# Patient Record
Sex: MALE | Race: WHITE | NOT HISPANIC OR LATINO | Employment: OTHER | URBAN - NONMETROPOLITAN AREA
[De-identification: names, ages, dates, MRNs, and addresses within clinical notes are randomized per-mention and may not be internally consistent; named-entity substitution may affect disease eponyms.]

---

## 2017-01-30 ENCOUNTER — TELEPHONE (OUTPATIENT)
Dept: CARDIOLOGY | Facility: CLINIC | Age: 72
End: 2017-01-30

## 2017-01-30 NOTE — TELEPHONE ENCOUNTER
Pt's wife called, has an appointment with Claribel 2/8/17, having a lot more problems, would like to see Dr Kaplan if possible.  Appointment changed to 2/ 2/17 at 3:00.  Wife aware.

## 2017-02-02 ENCOUNTER — OFFICE VISIT (OUTPATIENT)
Dept: CARDIOLOGY | Facility: CLINIC | Age: 72
End: 2017-02-02

## 2017-02-02 VITALS
HEART RATE: 80 BPM | BODY MASS INDEX: 31.7 KG/M2 | HEIGHT: 69 IN | WEIGHT: 214 LBS | DIASTOLIC BLOOD PRESSURE: 62 MMHG | SYSTOLIC BLOOD PRESSURE: 138 MMHG

## 2017-02-02 DIAGNOSIS — I10 ESSENTIAL HYPERTENSION: ICD-10-CM

## 2017-02-02 DIAGNOSIS — E78.00 HYPERCHOLESTEREMIA: ICD-10-CM

## 2017-02-02 DIAGNOSIS — I25.9 IHD (ISCHEMIC HEART DISEASE): Primary | ICD-10-CM

## 2017-02-02 DIAGNOSIS — R09.89 BILATERAL CAROTID BRUITS: ICD-10-CM

## 2017-02-02 DIAGNOSIS — R07.89 SENSATION OF CHEST PRESSURE: ICD-10-CM

## 2017-02-02 DIAGNOSIS — E11.9 TYPE 2 DIABETES MELLITUS WITHOUT COMPLICATION, WITH LONG-TERM CURRENT USE OF INSULIN (HCC): ICD-10-CM

## 2017-02-02 DIAGNOSIS — R06.02 SHORTNESS OF BREATH: ICD-10-CM

## 2017-02-02 DIAGNOSIS — Z79.4 TYPE 2 DIABETES MELLITUS WITHOUT COMPLICATION, WITH LONG-TERM CURRENT USE OF INSULIN (HCC): ICD-10-CM

## 2017-02-02 DIAGNOSIS — R42 DIZZINESS: ICD-10-CM

## 2017-02-02 DIAGNOSIS — R01.1 MURMUR, CARDIAC: ICD-10-CM

## 2017-02-02 PROCEDURE — 99214 OFFICE O/P EST MOD 30 MIN: CPT | Performed by: INTERNAL MEDICINE

## 2017-02-02 RX ORDER — CHOLESTYRAMINE LIGHT 4 G/5.7G
4 POWDER, FOR SUSPENSION ORAL 2 TIMES DAILY
Qty: 60 PACKET | Refills: 8 | Status: SHIPPED | OUTPATIENT
Start: 2017-02-02 | End: 2018-02-05 | Stop reason: ALTCHOICE

## 2017-02-02 RX ORDER — ISOSORBIDE MONONITRATE 30 MG/1
30 TABLET, EXTENDED RELEASE ORAL DAILY
COMMUNITY
End: 2017-07-31 | Stop reason: ALTCHOICE

## 2017-02-02 NOTE — PROGRESS NOTES
"Chief Complaint   Patient presents with   • Follow-up     Wife reports that last Sunday his lips turned blue while at [employer], he went home place oxygen on and was ok. He states \"Dr Arredondo does like how neck sounds\". He reports has occ sharp pain left chest and having left elbow pain, takes Nitro that relieves the pain. He states sometimes feels like heart is skipping beats.    • Dizziness     Is getting worse.  PCP writes refills on medication. Has increased Praluent to 150mg every 2 weeks. Patient has recent labs.       CARDIAC COMPLAINTS  chest pressure/discomfort, dyspnea and Dizziness        Subjective   Garry Yen is a 71 y.o. male came in today to be reevaluated for new symptoms.  He was diagnosed with ischemic heart disease in 2013 when he underwent stenting.  He also had more stenting placed in June of last year.  He was also noted to have significant carotid artery disease and underwent stenting in 2014.  He did have some narrowing by a carotid ultrasound last year.  He also has severe dyslipidemia and unable to tolerate any of the cholesterol lowering medications.  He was tried on Repatha, and apparently he did increase his sugar.  He is now on Praluent and still has a cholesterol of 500 and triglyceride of more than 3000.  His A1c is still not controlled at 7.8.  Apparently last Sunday while he was at [employer], his lips turned blue and he felt dizzy and lightheaded he started noticing some chest tightness and elbow pain.  He took some nitroglycerin and had some relief.  He did undergo some workup last year which showed essentially more often infarct.              Cardiac History  Past Surgical History   Procedure Laterality Date   • Cardiac catheterization  06/16/2016     FFR of RCA- 0.72- 3.0x22 & 3.0x12 Resolute. FFR of LAD- 0.74- 2.5x8 & 2.5x26 Resolute   • Echo - converted  09/24/2007     Echo-([facility] EF 60%   • Cardiothoracic procedure  09/20/2007     MAINOR.Cardiolite () Normal   • " Cardiovascular stress test  09/08/2009     LAlexisMyoview-() Normal   • Cardiovascular stress test  03/03/2011     LAlexisMyoview-() Normal   • Echo - converted  03/03/2011     Echo-() EF65%   • Cardiovascular stress test  08/12/2014     L.Myoview-inferior wall ischemia, Ranexa increased   • Echo - converted  08/12/2014     Echo-EF 40-45%, RVSP 16mmHg.   • Cardiovascular stress test  03/28/2016     EF 38%, inferior infarct, tahir-infarct ischemia   • Cardiovascular stress test  07/06/2016     EF 33%, WMA inferior wall, fixed defect no ischemia   • Cardiovascular stress test  03/28/2016     LAlexis Cardiolite @ [facility]- EF 38%. Inferior Infarct and Ischemia   • Cardiovascular stress test  02/17/2006     Cardiolite-()Normal   • Cardiac catheterization  07/16/2013     Cath-() 70%LAD. 90%RCA-3.5 x 18 & 3.5 x 15 Resolute Stents   • Carotid stent Left 11/05/2014     - 10x42 Wall Stent   • Us carotid unilateral  03/21/2011     Normnal   • Us carotid unilateral  10/13/2014     70% (L) ICA.100% (R) ICA   • Us carotid unilateral  11/24/2015     50% (L) ICA. 100% (R) ICA   • Echo - converted  08/11/2016     EF 40%. Inferior WMA.   • Cardiovascular stress test  07/06/2016     @[facility]. L. Myoview- EF 33%. Inferior and Small Anterior Infarct.       Current Outpatient Prescriptions   Medication Sig Dispense Refill   • Alirocumab (PRALUENT) 150 MG/ML solution pen-injector Inject  under the skin Every 14 (Fourteen) Days.     • amLODIPine-benazepril (LOTREL) 10-20 MG per capsule Take 1 capsule by mouth daily.     • aspirin 81 MG EC tablet Take 81 mg by mouth daily.     • clopidogrel (PLAVIX) 75 MG tablet Take 75 mg by mouth daily.     • gabapentin (NEURONTIN) 300 MG capsule Take 300 mg by mouth 3 (three) times a day.     • insulin glargine (LANTUS) 100 UNIT/ML injection Inject  under the skin daily.     • isosorbide mononitrate (IMDUR) 30 MG 24 hr tablet Take 30 mg by mouth Daily.     •  metFORMIN (GLUCOPHAGE) 1000 MG tablet Take 1,000 mg by mouth 2 (two) times a day with meals.     • ranitidine (ZANTAC) 150 MG tablet Take 150 mg by mouth 2 (two) times a day.     • ranolazine (RANEXA) 1000 MG 12 hr tablet Take 1,000 mg by mouth 2 (two) times a day.     • SLO-NIACIN PO Take 1,000 mg by mouth 2 (two) times a day.     • cholestyramine light 4 G packet Take 1 packet by mouth 2 (Two) Times a Day. 60 packet 8     No current facility-administered medications for this visit.        Allergies:  Morphine and related and Niacin and related      Past Medical History   Diagnosis Date   • Bilat. inguinal hernia repair    • CAD (coronary artery disease)    • Cancer      skin cancer removed faced   • Carotid stenosis    • Diabetes mellitus    • Diverticulitis    • GI bleed    • History of knee replacement      Bilat.   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     Social History Main Topics   • Smoking status: Former Smoker     Quit date: 1987   • Smokeless tobacco: Former User     Quit date: 2015   • Alcohol use No   • Drug use: No   • Sexual activity: Not on file     Other Topics Concern   • Not on file     Social History Narrative       Family History   Problem Relation Age of Onset   • Hyperlipidemia Mother    • Pancreatitis Father    • Diabetes Father    • Heart disease Sister    • Heart disease Brother    • Hypertension Daughter    • Hypertension Grandchild        Review of Systems   Constitutional: Positive for fatigue. Negative for activity change.   HENT: Negative for congestion.    Eyes: Negative for discharge.   Respiratory: Positive for chest tightness and shortness of breath.    Cardiovascular: Positive for chest  "pain.   Gastrointestinal: Negative for abdominal distention.   Endocrine: Negative for cold intolerance.   Genitourinary: Negative for difficulty urinating.   Musculoskeletal: Positive for arthralgias and myalgias.   Skin: Negative for color change.   Allergic/Immunologic: Negative for environmental allergies.   Neurological: Negative for dizziness.   Hematological: Negative for adenopathy.   Psychiatric/Behavioral: Negative for agitation.       Diabetes- Yes  Thyroid- normal    Objective     Visit Vitals   • /62 (BP Location: Left arm)   • Pulse 80   • Ht 69\" (175.3 cm)   • Wt 214 lb (97.1 kg)   • BMI 31.6 kg/m2       Physical Exam   Constitutional: He appears well-developed.   HENT:   Head: Normocephalic.   Eyes: Pupils are equal, round, and reactive to light.   Neck: Normal range of motion. Carotid bruit is present.   Cardiovascular: Normal rate.    Murmur heard.  Pulmonary/Chest: Effort normal.   Abdominal: Soft.   Musculoskeletal: Normal range of motion.   Neurological: He is alert.   Skin: Skin is warm.   Psychiatric: He has a normal mood and affect.       Procedures          Assessment/Plan   His heart rate and blood pressure appears stable.  He does have a prominent carotid bruit.  I like to repeat his ultrasound, and if it is abnormal may need a CTA.  I like to repeat his stress and echo to reevaluate his LV function and also to look for the ischemia.  I had a long talk with him about his lipids.  Apparently he has tried all the statins, Tricor, Lopid, Welchol and apparently unable to tolerate any on of them.  I want to try cholestyramine and see whether he can tolerate it.  Based on the results of these test, further recommendations will be made.  Garry was seen today for follow-up and dizziness.    Diagnoses and all orders for this visit:    IHD (ischemic heart disease)  -     Stress Test With Myocardial Perfusion One Day; Future    Essential hypertension    Hypercholesteremia    Type 2 diabetes " mellitus without complication, with long-term current use of insulin    Dizziness  -     US Carotid Bilateral; Future    Sensation of chest pressure  -     Stress Test With Myocardial Perfusion One Day; Future    Shortness of breath  -     Adult Transthoracic Echo Complete; Future    Murmur, cardiac  -     Adult Transthoracic Echo Complete; Future    Bilateral carotid bruits  -     US Carotid Bilateral; Future    Other orders  -     cholestyramine light 4 G packet; Take 1 packet by mouth 2 (Two) Times a Day.                         Electronically signed by Augie Kaplan MD February 2, 2017 4:20 PM

## 2017-02-08 ENCOUNTER — OUTSIDE FACILITY SERVICE (OUTPATIENT)
Dept: CARDIOLOGY | Facility: CLINIC | Age: 72
End: 2017-02-08

## 2017-02-08 ENCOUNTER — HOSPITAL ENCOUNTER (OUTPATIENT)
Dept: CARDIOLOGY | Facility: HOSPITAL | Age: 72
Discharge: HOME | End: 2017-02-08
Attending: INTERNAL MEDICINE

## 2017-02-08 DIAGNOSIS — I77.9 RIGHT-SIDED CAROTID ARTERY DISEASE (HCC): Primary | ICD-10-CM

## 2017-02-08 DIAGNOSIS — R07.89 SENSATION OF CHEST PRESSURE: ICD-10-CM

## 2017-02-08 DIAGNOSIS — R06.02 SHORTNESS OF BREATH: ICD-10-CM

## 2017-02-08 DIAGNOSIS — I25.9 IHD (ISCHEMIC HEART DISEASE): ICD-10-CM

## 2017-02-08 DIAGNOSIS — R01.1 MURMUR, CARDIAC: ICD-10-CM

## 2017-02-08 LAB
MAXIMAL PREDICTED HEART RATE: 151 BPM
STRESS TARGET HR: 128 BPM

## 2017-02-08 PROCEDURE — 78452 HT MUSCLE IMAGE SPECT MULT: CPT | Performed by: INTERNAL MEDICINE

## 2017-02-08 PROCEDURE — A9500 TC99M SESTAMIBI: HCPCS | Performed by: INTERNAL MEDICINE

## 2017-02-08 PROCEDURE — 78452 HT MUSCLE IMAGE SPECT MULT: CPT

## 2017-02-08 PROCEDURE — 93017 CV STRESS TEST TRACING ONLY: CPT

## 2017-02-08 PROCEDURE — 0 TECHNETIUM SESTAMIBI: Performed by: INTERNAL MEDICINE

## 2017-02-08 PROCEDURE — 93306 TTE W/DOPPLER COMPLETE: CPT | Performed by: INTERNAL MEDICINE

## 2017-02-08 PROCEDURE — 93306 TTE W/DOPPLER COMPLETE: CPT

## 2017-02-08 PROCEDURE — 93018 CV STRESS TEST I&R ONLY: CPT | Performed by: INTERNAL MEDICINE

## 2017-02-08 PROCEDURE — 25010000002 REGADENOSON 0.4 MG/5ML SOLUTION: Performed by: INTERNAL MEDICINE

## 2017-02-08 RX ADMIN — REGADENOSON 0.4 MG: 0.08 INJECTION, SOLUTION INTRAVENOUS at 09:30

## 2017-02-08 RX ADMIN — Medication 1 DOSE: at 09:30

## 2017-02-09 ENCOUNTER — TELEPHONE (OUTPATIENT)
Dept: CARDIOLOGY | Facility: CLINIC | Age: 72
End: 2017-02-09

## 2017-02-09 NOTE — TELEPHONE ENCOUNTER
Patient aware of stress test results and recommendations for cardiac catheterization.  Patient willing to proceed and scheduled for 2/15/17.  Patient aware of instructions.

## 2017-02-15 ENCOUNTER — OUTSIDE FACILITY SERVICE (OUTPATIENT)
Dept: CARDIOLOGY | Facility: CLINIC | Age: 72
End: 2017-02-15

## 2017-02-15 PROCEDURE — 93458 L HRT ARTERY/VENTRICLE ANGIO: CPT | Performed by: INTERNAL MEDICINE

## 2017-02-16 ENCOUNTER — TELEPHONE (OUTPATIENT)
Dept: CARDIOLOGY | Facility: CLINIC | Age: 72
End: 2017-02-16

## 2017-02-16 ENCOUNTER — OUTSIDE FACILITY SERVICE (OUTPATIENT)
Dept: CARDIOLOGY | Facility: CLINIC | Age: 72
End: 2017-02-16

## 2017-02-16 NOTE — TELEPHONE ENCOUNTER
Felicia from [facility] requesting new Entresto free trial card be sent to pharmacy. Entresto free trial card sent to pharmacy.

## 2017-02-16 NOTE — TELEPHONE ENCOUNTER
Clinical review done for PA for Lifevest.  Leatha EAGLE# [id number] per Cristina EAGLE faxed to [facility].    (Leatha EAGLE for Lifevest is approved for 30 days at a time.  Each 30 days must be preapproved with Leatha.)

## 2017-02-17 ENCOUNTER — DOCUMENTATION (OUTPATIENT)
Dept: CARDIOLOGY | Facility: CLINIC | Age: 72
End: 2017-02-17

## 2017-02-17 ENCOUNTER — TELEPHONE (OUTPATIENT)
Dept: CARDIOLOGY | Facility: CLINIC | Age: 72
End: 2017-02-17

## 2017-02-17 DIAGNOSIS — I42.9 CARDIOMYOPATHY (HCC): Primary | ICD-10-CM

## 2017-02-17 DIAGNOSIS — I25.119 ATHEROSCLEROSIS OF NATIVE CORONARY ARTERY OF NATIVE HEART WITH ANGINA PECTORIS (HCC): ICD-10-CM

## 2017-02-17 NOTE — PROGRESS NOTES
PA for Entresto 49-51 mg completed and faxed 02/17/2017    PA for Entresto approved from 224/17 through 2/24/18. Patients wife aware and will contact pharmacy.

## 2017-02-21 ENCOUNTER — TELEPHONE (OUTPATIENT)
Dept: CARDIOLOGY | Facility: CLINIC | Age: 72
End: 2017-02-21

## 2017-02-21 DIAGNOSIS — I51.9 SYSTOLIC DYSFUNCTION: ICD-10-CM

## 2017-02-21 DIAGNOSIS — I25.5 ISCHEMIC CARDIOMYOPATHY: Primary | ICD-10-CM

## 2017-03-29 ENCOUNTER — OFFICE VISIT (OUTPATIENT)
Dept: CARDIOLOGY | Facility: CLINIC | Age: 72
End: 2017-03-29

## 2017-03-29 ENCOUNTER — OUTSIDE FACILITY SERVICE (OUTPATIENT)
Dept: CARDIOLOGY | Facility: CLINIC | Age: 72
End: 2017-03-29

## 2017-03-29 ENCOUNTER — HOSPITAL ENCOUNTER (OUTPATIENT)
Dept: CARDIOLOGY | Facility: HOSPITAL | Age: 72
Discharge: HOME | End: 2017-03-29
Attending: INTERNAL MEDICINE

## 2017-03-29 VITALS
SYSTOLIC BLOOD PRESSURE: 146 MMHG | BODY MASS INDEX: 32.29 KG/M2 | DIASTOLIC BLOOD PRESSURE: 70 MMHG | WEIGHT: 218 LBS | HEART RATE: 80 BPM | HEIGHT: 69 IN

## 2017-03-29 DIAGNOSIS — E78.01 FAMILIAL HYPERCHOLESTEROLEMIA: ICD-10-CM

## 2017-03-29 DIAGNOSIS — I25.10 CORONARY ARTERY DISEASE INVOLVING NATIVE CORONARY ARTERY OF NATIVE HEART WITHOUT ANGINA PECTORIS: Primary | ICD-10-CM

## 2017-03-29 DIAGNOSIS — I25.119 ATHEROSCLEROSIS OF NATIVE CORONARY ARTERY OF NATIVE HEART WITH ANGINA PECTORIS (HCC): ICD-10-CM

## 2017-03-29 DIAGNOSIS — E78.2 ELEVATED TRIGLYCERIDES WITH HIGH CHOLESTEROL: ICD-10-CM

## 2017-03-29 DIAGNOSIS — I77.9 BILATERAL CAROTID ARTERY DISEASE (HCC): ICD-10-CM

## 2017-03-29 DIAGNOSIS — I10 ESSENTIAL HYPERTENSION: ICD-10-CM

## 2017-03-29 DIAGNOSIS — I42.9 CARDIOMYOPATHY (HCC): ICD-10-CM

## 2017-03-29 DIAGNOSIS — I73.9 PVD (PERIPHERAL VASCULAR DISEASE) (HCC): ICD-10-CM

## 2017-03-29 DIAGNOSIS — I50.30 CONGESTIVE HEART FAILURE WITH LV DIASTOLIC DYSFUNCTION, NYHA CLASS 2 (HCC): ICD-10-CM

## 2017-03-29 LAB
MAXIMAL PREDICTED HEART RATE: 151 BPM
STRESS TARGET HR: 128 BPM

## 2017-03-29 PROCEDURE — 0 TECHNETIUM LABELED RED BLOOD CELLS: Performed by: INTERNAL MEDICINE

## 2017-03-29 PROCEDURE — 78472 GATED HEART PLANAR SINGLE: CPT

## 2017-03-29 PROCEDURE — 99213 OFFICE O/P EST LOW 20 MIN: CPT | Performed by: NURSE PRACTITIONER

## 2017-03-29 PROCEDURE — 25010000002 HEPARIN FLUSH (PORCINE) 100 UNIT/ML SOLUTION: Performed by: INTERNAL MEDICINE

## 2017-03-29 PROCEDURE — 78472 GATED HEART PLANAR SINGLE: CPT | Performed by: INTERNAL MEDICINE

## 2017-03-29 PROCEDURE — A9560 TC99M LABELED RBC: HCPCS | Performed by: INTERNAL MEDICINE

## 2017-03-29 RX ADMIN — HEPARIN SODIUM (PORCINE) LOCK FLUSH IV SOLN 100 UNIT/ML 10 UNITS: 100 SOLUTION at 15:00

## 2017-03-29 RX ADMIN — Medication 1 DOSE: at 15:00

## 2017-03-29 NOTE — PROGRESS NOTES
Chief Complaint   Patient presents with   • Hospital follow up     Cath with stenting, EF 20-30%, currently wearing a life vest, went off once while riding the four remy, they told him the jar had set it off.  Otherwise pt is doing good,  denies any chest pain since getting stents.    • Med Refill     Entresto started, refills needed 30 days to [facility] in [place].    • MUGA     Today, pt has report.        Subjective       Garry Yen is a 71 y.o. male diagnosed with ischemic heart disease in 2013 when he underwent stenting.  He also had more stenting placed in June of 2016.  He was also noted to have significant carotid artery disease and underwent stenting in 2014.  He also has severe dyslipidemia and unable to tolerate any of the cholesterol lowering medications.  Repatha apparently increased his sugar.  He was put on Praluent but cholesterol and triglyceride remained elevated. His cardiac workup in 2016 showed essentialy infarct.  In February, he came to office due to development of new symptoms. Apparently, his lips turned blue and he felt dizzy and lightheaded he started noticing some chest tightness and elbow pain, relieved with nitro. Questran was added for lipid control. A stress test and echocardiogram ordered that showed decrease in LV function and ischemia. Carotid ultrasound was also done and showed 50-69% disease of left and occlusion of right.  He then underwent a cardiac catheterization with stenting of his RCA x2. Left carotid artery was noted to have 50 % stenosis. LV function was poor being <30%. A life vest was placed. He was started on Entresto.  Today he comes to the office for a hospital follow up visit. He reports he has more energy and has not had to use any nitrostat. Shortness of breath is better and he has not had significant edema.       HPI         Cardiac History:    Past Surgical History:   Procedure Laterality Date   • CARDIAC CATHETERIZATION  06/16/2016    FFR of RCA-  0.72- 3.0x22 & 3.0x12 Resolute. FFR of LAD- 0.74- 2.5x8 & 2.5x26 Resolute   • CARDIAC CATHETERIZATION  07/16/2013    Cath-() 70%LAD. 90%RCA-3.5 x 18 & 3.5 x 15 Resolute Stents   • CARDIAC CATHETERIZATION  02/15/2017    85% RCA- 3.5x24 & 3.5x20 Promus. 50% (L) ICA.   • CARDIOTHORACIC PROCEDURE  09/20/2007    A.Cardiolite () Normal   • CARDIOVASCULAR STRESS TEST  09/08/2009    L.Myoview-() Normal   • CARDIOVASCULAR STRESS TEST  03/03/2011    L.Myoview-() Normal   • CARDIOVASCULAR STRESS TEST  08/12/2014    L.Myoview-inferior wall ischemia, Ranexa increased   • CARDIOVASCULAR STRESS TEST  03/28/2016    EF 38%, inferior infarct, tahir-infarct ischemia   • CARDIOVASCULAR STRESS TEST  07/06/2016    EF 33%, WMA inferior wall, fixed defect no ischemia   • CARDIOVASCULAR STRESS TEST  03/28/2016    L. Cardiolite @ [facility]- EF 38%. Inferior Infarct and Ischemia   • CARDIOVASCULAR STRESS TEST  02/17/2006    Cardiolite-()Normal   • CARDIOVASCULAR STRESS TEST  07/06/2016    @[facility]. LAlexis Myoview- EF 33%. Inferior and Small Anterior Infarct.   • CARDIOVASCULAR STRESS TEST  02/08/2017    LAlexis Myoview- EF 30%. Inferior Ischemia.   • CAROTID STENT Left 11/05/2014    - 10x42 Wall Stent   • ECHO - CONVERTED  09/24/2007    Echo-([facility].) EF 60%   • ECHO - CONVERTED  03/03/2011    Echo-() EF65%   • ECHO - CONVERTED  08/12/2014    Echo-EF 40-45%, RVSP 16mmHg.   • ECHO - CONVERTED  08/11/2016    EF 40%. Inferior WMA.   • ECHO - CONVERTED  02/08/2017    EF 30%. Inferior WMA.   • US CAROTID UNILATERAL  03/21/2011    Normnal   • US CAROTID UNILATERAL  10/13/2014    70% (L) ICA.100% (R) ICA   • US CAROTID UNILATERAL  11/24/2015    50% (L) ICA. 100% (R) ICA   • US CAROTID UNILATERAL  02/08/2017    100% (R) ICA. < 69% (L) ICA       Current Outpatient Prescriptions   Medication Sig Dispense Refill   • Alirocumab (PRALUENT) 150 MG/ML solution pen-injector Inject  under the skin Every  14 (Fourteen) Days.     • aspirin 81 MG EC tablet Take 81 mg by mouth Daily. Increase to twice a day     • cholestyramine light 4 G packet Take 1 packet by mouth 2 (Two) Times a Day. 60 packet 8   • clopidogrel (PLAVIX) 75 MG tablet Take 75 mg by mouth daily.     • gabapentin (NEURONTIN) 300 MG capsule Take 300 mg by mouth 3 (three) times a day.     • insulin glargine (LANTUS) 100 UNIT/ML injection Inject  under the skin daily.     • isosorbide mononitrate (IMDUR) 30 MG 24 hr tablet Take 30 mg by mouth Daily.     • metFORMIN (GLUCOPHAGE) 1000 MG tablet Take 1,000 mg by mouth 2 (two) times a day with meals.     • ranitidine (ZANTAC) 150 MG tablet Take 150 mg by mouth 2 (two) times a day.     • ranolazine (RANEXA) 1000 MG 12 hr tablet Take 1,000 mg by mouth 2 (two) times a day.     • sacubitril-valsartan (ENTRESTO) 49-51 MG tablet Take 1 tablet by mouth 2 (Two) Times a Day. 60 tablet 11   • SLO-NIACIN PO Take 1,000 mg by mouth 2 (two) times a day.       No current facility-administered medications for this visit.        Morphine and related and Niacin and related    Past Medical History:   Diagnosis Date   • Bilat. inguinal hernia repair    • CAD (coronary artery disease)    • Cancer     skin cancer removed faced   • Carotid stenosis    • Diabetes mellitus    • Diverticulitis    • GI bleed    • History of knee replacement     Bilat.   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     Social History Main Topics   • Smoking status: Former Smoker     Quit date: 1987   • Smokeless tobacco: Former User     Quit date: 2015   • Alcohol use No   • Drug use: No   • Sexual activity: Not on file     Other Topics Concern   • Not on file     Social  "History Narrative       Family History   Problem Relation Age of Onset   • Hyperlipidemia Mother    • Pancreatitis Father    • Diabetes Father    • Heart disease Sister    • Heart disease Brother    • Hypertension Daughter    • Hypertension Grandchild        Review of Systems   Constitutional: Negative for activity change and appetite change.   HENT: Negative.    Respiratory: Positive for shortness of breath. Negative for cough and wheezing.    Cardiovascular: Positive for leg swelling (mild, relates to diabetes). Negative for chest pain and palpitations.   Gastrointestinal: Negative.    Genitourinary: Negative.    Musculoskeletal: Positive for arthralgias. Negative for back pain.   Skin: Negative.    Neurological: Negative for dizziness, syncope, facial asymmetry, speech difficulty, weakness and light-headedness.   Psychiatric/Behavioral: Negative.        Diabetes- Yes  Thyroid-normal    Objective     /70  Pulse 80  Ht 69\" (175.3 cm)  Wt 218 lb (98.9 kg)  BMI 32.19 kg/m2    Physical Exam   Constitutional: Vital signs are normal.   Eyes: Pupils are equal, round, and reactive to light.   Neck: Neck supple. No JVD present.   Cardiovascular: Normal rate, regular rhythm, S1 normal and S2 normal.    Murmur heard.   Systolic murmur is present with a grade of 2/6   Pulses:       Radial pulses are 2+ on the right side, and 2+ on the left side.   Pulmonary/Chest: Effort normal and breath sounds normal. He has no wheezes. He has no rales.   Abdominal: Soft. Bowel sounds are normal.   Musculoskeletal: He exhibits edema (mild ankles).   Neurological: He is alert.   Skin: Skin is warm and dry.   Psychiatric: He has a normal mood and affect. His behavior is normal. Judgment normal.   Vitals reviewed.    Procedures        Assessment/Plan      Garry was seen today for hospital follow up, med refill and muga.    Diagnoses and all orders for this visit:    Coronary artery disease involving native coronary artery of native " heart without angina pectoris    Congestive heart failure with LV diastolic dysfunction, NYHA class 2    Essential hypertension    Familial hypercholesterolemia    Elevated triglycerides with high cholesterol    Bilateral carotid artery disease    PVD (peripheral vascular disease)    Other orders  -     sacubitril-valsartan (ENTRESTO) 49-51 MG tablet; Take 1 tablet by mouth 2 (Two) Times a Day.        Mr. Yen had a MUGA scan today that shows improvement of LVEF to 41% and RVEF 47%. He was instructed to discontinue life vest. His blood pressure and heart rate are normal. Same cardiac medications recommended. The dose of aspirin increased to 81 mg bid. Continue Plavix at 75 mg daily. He will be seeing you tomorrow for repeat labs. Please send a copy when available. He currently remains on Praluent and cholestyramine without problem.   We will see him back in 4 months or sooner for problems.            Electronically signed by JULIO CÉSAR Castelan,  March 29, 2017 4:14 PM

## 2017-07-17 ENCOUNTER — TELEPHONE (OUTPATIENT)
Dept: CARDIOLOGY | Facility: CLINIC | Age: 72
End: 2017-07-17

## 2017-07-17 NOTE — TELEPHONE ENCOUNTER
Called patient's spouse with recommendation's to try decreasing Ranexa 500mg bid, hold Imdur, monitor b/p and patient is not taking Lotrel at this time, states it was discontinued a while ago. Instructed to call office for any further concern's.

## 2017-07-17 NOTE — TELEPHONE ENCOUNTER
Try decreasing Ranexa to 500 bid, hold Imdur. He also has carotid artery disease so may not tolerate to low of blood pressure. Is he taking Lotrel and Entresto? If so, he has to stop Lotrel.

## 2017-07-17 NOTE — TELEPHONE ENCOUNTER
Received call from lakhwinder's spouse reporting that patient has been experiencing severe dizziness and passed out Saturday morning and they had went to ER @ [facility] (i have called and requested records) and was sent back home, states this morning at 1:30 this am patient had another episode of near syncope, (b/p 100/50) states has been sick at stomach and having chest pain, also has history of Pancreatitis, want's to know if can come in to be seen today? What are your recommendations?    States having dizziness all the time and with both syncopal episodes patient was getting up to go to bathroom, i had instructed her to have patient to rise slowly due to drop in b/p.      Meds:  Isosorbide mononitrate 30mg qd  entresto 49/51mg bid  Coreg 3.125mg bid  ranexa 1000mg bid  plavix 75mg qd  Aspirin 81mg bid

## 2017-07-31 ENCOUNTER — TELEPHONE (OUTPATIENT)
Dept: CARDIOLOGY | Facility: CLINIC | Age: 72
End: 2017-07-31

## 2017-07-31 ENCOUNTER — OFFICE VISIT (OUTPATIENT)
Dept: CARDIOLOGY | Facility: CLINIC | Age: 72
End: 2017-07-31

## 2017-07-31 VITALS
DIASTOLIC BLOOD PRESSURE: 70 MMHG | HEIGHT: 69 IN | SYSTOLIC BLOOD PRESSURE: 130 MMHG | BODY MASS INDEX: 32.88 KG/M2 | HEART RATE: 76 BPM | WEIGHT: 222 LBS

## 2017-07-31 DIAGNOSIS — I10 ESSENTIAL HYPERTENSION: ICD-10-CM

## 2017-07-31 DIAGNOSIS — I25.118 CORONARY ARTERY DISEASE INVOLVING NATIVE CORONARY ARTERY OF NATIVE HEART WITH OTHER FORM OF ANGINA PECTORIS (HCC): ICD-10-CM

## 2017-07-31 DIAGNOSIS — I50.30 CONGESTIVE HEART FAILURE WITH LV DIASTOLIC DYSFUNCTION, NYHA CLASS 2 (HCC): Primary | ICD-10-CM

## 2017-07-31 DIAGNOSIS — I77.9 BILATERAL CAROTID ARTERY DISEASE (HCC): ICD-10-CM

## 2017-07-31 DIAGNOSIS — R55 SYNCOPE AND COLLAPSE: ICD-10-CM

## 2017-07-31 DIAGNOSIS — E78.01 FAMILIAL HYPERCHOLESTEROLEMIA: ICD-10-CM

## 2017-07-31 DIAGNOSIS — I25.6 SILENT MYOCARDIAL ISCHEMIA: ICD-10-CM

## 2017-07-31 PROCEDURE — 99214 OFFICE O/P EST MOD 30 MIN: CPT | Performed by: NURSE PRACTITIONER

## 2017-07-31 RX ORDER — AMLODIPINE BESYLATE 5 MG/1
TABLET ORAL
Qty: 30 TABLET | Refills: 8 | Status: SHIPPED | OUTPATIENT
Start: 2017-07-31 | End: 2018-07-18 | Stop reason: SDUPTHER

## 2017-07-31 RX ORDER — RANOLAZINE 1000 MG/1
1000 TABLET, EXTENDED RELEASE ORAL EVERY 12 HOURS SCHEDULED
COMMUNITY
End: 2018-08-06 | Stop reason: SDUPTHER

## 2017-07-31 RX ORDER — AMLODIPINE BESYLATE 5 MG/1
5 TABLET ORAL DAILY
COMMUNITY
End: 2017-07-31 | Stop reason: SDUPTHER

## 2017-07-31 RX ORDER — CARVEDILOL 3.12 MG/1
3.12 TABLET ORAL 2 TIMES DAILY WITH MEALS
Qty: 60 TABLET | Refills: 8 | Status: SHIPPED | OUTPATIENT
Start: 2017-07-31 | End: 2018-05-31 | Stop reason: SDUPTHER

## 2017-07-31 RX ORDER — CARVEDILOL 3.12 MG/1
3.12 TABLET ORAL 2 TIMES DAILY WITH MEALS
COMMUNITY
End: 2017-07-31 | Stop reason: SDUPTHER

## 2017-07-31 NOTE — PROGRESS NOTES
"Chief Complaint   Patient presents with   • Follow-up     4 month follow-up, no recent labs, patient complaint's of fatique, had been having dizziness and syncopal episodes which has improved after lotrel stopped and Ranexa decreased to once a day. Patient brought medication's with visit.    • Chest Pain     \"some not too many\"   • Shortness of Breath     \"with exertion\"       Cardiac Complaints  chest pressure/discomfort, dyspnea and syncope      Subjective   Garry Yen is a 72 y.o. male diagnosed with ischemic heart disease in 2013 when he underwent stenting.  He also had more stenting placed in June of 2016.  He was also noted to have significant carotid artery disease and underwent stenting in 2014.  He also has severe dyslipidemia and unable to tolerate any of the cholesterol lowering medications.  Repatha apparently increased his sugar.  He was put on Praluent but cholesterol and triglyceride remained elevated. His cardiac workup in 2016 showed essentialy infarct.  In February, he came to office due to development of new symptoms. Apparently, his lips turned blue and he felt dizzy and lightheaded he started noticing some chest tightness and elbow pain, relieved with nitro. Questran was added for lipid control. A stress test and echocardiogram ordered that showed decrease in LV function and ischemia. Carotid ultrasound was also done and showed 50-69% disease of left and occlusion of right.  He then underwent a cardiac catheterization with stenting of his RCA x2. Left carotid artery was noted to have 50% stenosis. LV function was poor being <30%. A life vest was placed. He was then started on Entresto.  Most recent MUGA scan showed improvement of LVEF to 41% and RVEF to 47% his life vest was discontinued.  He returns today for follow up and reports he was having a great deal of syncopal episodes some time back.  His wife reports that medication changes, including the discontinuation of lotrel and decreasing " ranexa were made.  Since this time, syncopal episodes have greatly improved.  In regards to chest discomfort, he states he still has a rare chest pain that is better than prior.  He does also report continued shortness of breath with exertion but again he states this is better than before.  Labs he reports with PCP, no copies are available and they are unsure of the last check.  Refills of norvasc and coreg requested.          Cardiac History  Past Surgical History:   Procedure Laterality Date   • CARDIAC CATHETERIZATION  06/16/2016    FFR of RCA- 0.72- 3.0x22 & 3.0x12 Resolute. FFR of LAD- 0.74- 2.5x8 & 2.5x26 Resolute   • CARDIAC CATHETERIZATION  07/16/2013    Cath-() 70%LAD. 90%RCA-3.5 x 18 & 3.5 x 15 Resolute Stents   • CARDIAC CATHETERIZATION  02/15/2017    85% RCA- 3.5x24 & 3.5x20 Promus. 50% (L) ICA.   • CARDIOTHORACIC PROCEDURE  09/20/2007    A.Cardiolite () Normal   • CARDIOVASCULAR STRESS TEST  09/08/2009    L.Myoview-() Normal   • CARDIOVASCULAR STRESS TEST  03/03/2011    L.Myoview-() Normal   • CARDIOVASCULAR STRESS TEST  08/12/2014    L.Myoview-inferior wall ischemia, Ranexa increased   • CARDIOVASCULAR STRESS TEST  03/28/2016    EF 38%, inferior infarct, tahir-infarct ischemia   • CARDIOVASCULAR STRESS TEST  07/06/2016    EF 33%, WMA inferior wall, fixed defect no ischemia   • CARDIOVASCULAR STRESS TEST  03/28/2016    L. Cardiolite @ [facility]- EF 38%. Inferior Infarct and Ischemia   • CARDIOVASCULAR STRESS TEST  02/17/2006    Cardiolite-()Normal   • CARDIOVASCULAR STRESS TEST  07/06/2016    @[facility]. L. Myoview- EF 33%. Inferior and Small Anterior Infarct.   • CARDIOVASCULAR STRESS TEST  02/08/2017    L. Myoview- EF 30%. Inferior Ischemia.   • CAROTID STENT Left 11/05/2014    - 10x42 Wall Stent   • ECHO - CONVERTED  09/24/2007    Echo-([facility].) EF 60%   • ECHO - CONVERTED  03/03/2011    Echo-() EF65%   • ECHO - CONVERTED  08/12/2014     Echo-EF 40-45%, RVSP 16mmHg.   • ECHO - CONVERTED  08/11/2016    EF 40%. Inferior WMA.   • ECHO - CONVERTED  02/08/2017    EF 30%. Inferior WMA.   • US CAROTID UNILATERAL  03/21/2011    Normnal   • US CAROTID UNILATERAL  10/13/2014    70% (L) ICA.100% (R) ICA   • US CAROTID UNILATERAL  11/24/2015    50% (L) ICA. 100% (R) ICA   • US CAROTID UNILATERAL  02/08/2017    100% (R) ICA. < 69% (L) ICA       Current Outpatient Prescriptions   Medication Sig Dispense Refill   • Alirocumab (PRALUENT) 150 MG/ML solution pen-injector Inject  under the skin Every 14 (Fourteen) Days.     • amLODIPine (NORVASC) 5 MG tablet 1/2 to 1 tablet daily 30 tablet 8   • aspirin 81 MG EC tablet Increase to twice a day     • carvedilol (COREG) 3.125 MG tablet Take 1 tablet by mouth 2 (Two) Times a Day With Meals. 60 tablet 8   • cholestyramine light 4 G packet Take 1 packet by mouth 2 (Two) Times a Day. 60 packet 8   • clopidogrel (PLAVIX) 75 MG tablet Take 75 mg by mouth daily.     • gabapentin (NEURONTIN) 300 MG capsule Take 300 mg by mouth 3 (three) times a day.     • insulin glargine (LANTUS) 100 UNIT/ML injection Inject  under the skin daily.     • metFORMIN (GLUCOPHAGE) 1000 MG tablet Take 1,000 mg by mouth 2 (two) times a day with meals.     • ranitidine (ZANTAC) 150 MG tablet Take 150 mg by mouth 2 (two) times a day.     • ranolazine (RANEXA) 1000 MG 12 hr tablet Take 1,000 mg by mouth Daily.     • sacubitril-valsartan (ENTRESTO) 49-51 MG tablet Take 1 tablet by mouth 2 (Two) Times a Day. 60 tablet 11   • SLO-NIACIN PO Take 1,000 mg by mouth 2 (two) times a day.       No current facility-administered medications for this visit.        Morphine and related and Niacin and related    Past Medical History:   Diagnosis Date   • Bilat. inguinal hernia repair    • CAD (coronary artery disease)    • Cancer     skin cancer removed faced   • Carotid stenosis    • Diabetes mellitus    • Diverticulitis    • GI bleed    • History of knee replacement  "    Bilat.   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     Social History Main Topics   • Smoking status: Former Smoker     Quit date: 1987   • Smokeless tobacco: Former User     Quit date: 2015   • Alcohol use No   • Drug use: No   • Sexual activity: Not on file     Other Topics Concern   • Not on file     Social History Narrative       Family History   Problem Relation Age of Onset   • Hyperlipidemia Mother    • Pancreatitis Father    • Diabetes Father    • Heart disease Sister    • Heart disease Brother    • Hypertension Daughter    • Hypertension Grandchild        Review of Systems   Constitution: Negative for weakness and malaise/fatigue.   Cardiovascular: Positive for chest pain, near-syncope and syncope. Negative for dyspnea on exertion and palpitations.   Respiratory: Positive for shortness of breath. Negative for cough and wheezing.    Gastrointestinal: Negative for anorexia, heartburn and nausea.   Neurological: Positive for dizziness and loss of balance. Negative for focal weakness and light-headedness.   Psychiatric/Behavioral: Negative for altered mental status and depression.       DiabetesYes  Thyroidnormal    Objective     /70 (BP Location: Left arm)  Pulse 76  Ht 69\" (175.3 cm)  Wt 222 lb (101 kg)  BMI 32.78 kg/m2    Physical Exam   Constitutional: He is oriented to person, place, and time. He appears well-developed and well-nourished.   HENT:   Head: Normocephalic and atraumatic.   Eyes: EOM are normal. Pupils are equal, round, and reactive to light.   Neck: Normal range of motion. Neck supple. Carotid bruit is present.   Cardiovascular: Normal rate and regular rhythm.  "   Pulmonary/Chest: Effort normal and breath sounds normal.   Abdominal: Soft.   Musculoskeletal: Normal range of motion.   Neurological: He is alert and oriented to person, place, and time.   Skin: Skin is warm and dry.   Psychiatric: He has a normal mood and affect. His behavior is normal.       Procedures    Assessment/Plan     HR is stable today as well as blood pressure.  Sine he does report some syncopal episodes in July, we will advise on repeat echo to reassess LV function and repeat stress testing to rule out any silent ischemic burden that may be attributing to his syncopal spells. He denies any issues with low blood sugar.   More recommendations to follow.  We will advise to decrease amlodipine to 1/2 table daily and to keep a log, if his blood pressure is higher than 145 systolic he will be encouraged to take the other half of amlodpine. Labs he reports with you, he is unsure of last check.  Could we get next copies?  Refills of coreg and amlodipine sent per request.  Good cardiac ADA diet and activity as tolerated advised. Patient encouraged to go from sitting to standing slowly as his wife reports he just often wants to jump up quickly.  Repeat carotid US will be considered next visit.  6 month follow up scheduled or sooner if needed.      Problems Addressed this Visit        Cardiovascular and Mediastinum    HTN (hypertension)    Relevant Medications    amLODIPine (NORVASC) 5 MG tablet    carvedilol (COREG) 3.125 MG tablet    Other Relevant Orders    Stress Test With Myocardial Perfusion One Day    Adult Transthoracic Echo Complete    CAD (coronary artery disease)    Relevant Medications    ranolazine (RANEXA) 1000 MG 12 hr tablet    amLODIPine (NORVASC) 5 MG tablet    carvedilol (COREG) 3.125 MG tablet    Other Relevant Orders    Stress Test With Myocardial Perfusion One Day    Adult Transthoracic Echo Complete    Hyperlipidemia    Carotid artery disease    Congestive heart failure with LV diastolic  dysfunction, NYHA class 2 - Primary    Relevant Medications    ranolazine (RANEXA) 1000 MG 12 hr tablet    amLODIPine (NORVASC) 5 MG tablet    carvedilol (COREG) 3.125 MG tablet    Other Relevant Orders    Stress Test With Myocardial Perfusion One Day    Adult Transthoracic Echo Complete      Other Visit Diagnoses     Silent myocardial ischemia        Relevant Medications    ranolazine (RANEXA) 1000 MG 12 hr tablet    amLODIPine (NORVASC) 5 MG tablet    carvedilol (COREG) 3.125 MG tablet    Other Relevant Orders    Stress Test With Myocardial Perfusion One Day    Adult Transthoracic Echo Complete    Syncope and collapse        Relevant Orders    Stress Test With Myocardial Perfusion One Day    Adult Transthoracic Echo Complete                  Electronically signed by Lydia Blair, JULIO CÉSAR July 31, 2017 4:22 PM

## 2017-08-01 NOTE — TELEPHONE ENCOUNTER
Received call from patient's spouse instructed her that will be adding stress test to patient's echo and encourage to cut dosing of amlodipine in half  Patient may only add other half if sbp greater than 145. Caregiver verbalizes understanding of instructions.

## 2017-08-17 ENCOUNTER — HOSPITAL ENCOUNTER (OUTPATIENT)
Dept: CARDIOLOGY | Facility: HOSPITAL | Age: 72
Discharge: HOME | End: 2017-08-17

## 2017-08-17 ENCOUNTER — OUTSIDE FACILITY SERVICE (OUTPATIENT)
Dept: CARDIOLOGY | Facility: CLINIC | Age: 72
End: 2017-08-17

## 2017-08-17 DIAGNOSIS — I50.30 CONGESTIVE HEART FAILURE WITH LV DIASTOLIC DYSFUNCTION, NYHA CLASS 2 (HCC): ICD-10-CM

## 2017-08-17 DIAGNOSIS — R55 SYNCOPE AND COLLAPSE: ICD-10-CM

## 2017-08-17 DIAGNOSIS — I25.118 CORONARY ARTERY DISEASE INVOLVING NATIVE CORONARY ARTERY OF NATIVE HEART WITH OTHER FORM OF ANGINA PECTORIS (HCC): ICD-10-CM

## 2017-08-17 DIAGNOSIS — I10 ESSENTIAL HYPERTENSION: ICD-10-CM

## 2017-08-17 DIAGNOSIS — I25.6 SILENT MYOCARDIAL ISCHEMIA: ICD-10-CM

## 2017-08-17 LAB
MAXIMAL PREDICTED HEART RATE: 150 BPM
STRESS TARGET HR: 128 BPM

## 2017-08-17 PROCEDURE — 93017 CV STRESS TEST TRACING ONLY: CPT

## 2017-08-17 PROCEDURE — 0 TECHNETIUM SESTAMIBI: Performed by: INTERNAL MEDICINE

## 2017-08-17 PROCEDURE — A9500 TC99M SESTAMIBI: HCPCS | Performed by: INTERNAL MEDICINE

## 2017-08-17 PROCEDURE — 78452 HT MUSCLE IMAGE SPECT MULT: CPT

## 2017-08-17 PROCEDURE — 93306 TTE W/DOPPLER COMPLETE: CPT | Performed by: INTERNAL MEDICINE

## 2017-08-17 PROCEDURE — 93018 CV STRESS TEST I&R ONLY: CPT | Performed by: INTERNAL MEDICINE

## 2017-08-17 PROCEDURE — 78452 HT MUSCLE IMAGE SPECT MULT: CPT | Performed by: INTERNAL MEDICINE

## 2017-08-17 PROCEDURE — 93306 TTE W/DOPPLER COMPLETE: CPT

## 2017-08-17 PROCEDURE — 25010000002 REGADENOSON 0.4 MG/5ML SOLUTION: Performed by: INTERNAL MEDICINE

## 2017-08-17 RX ADMIN — REGADENOSON 0.4 MG: 0.08 INJECTION, SOLUTION INTRAVENOUS at 13:00

## 2017-08-17 RX ADMIN — TECHNETIUM TC-99M SESTAMIBI 1 DOSE: 1 INJECTION INTRAVENOUS at 13:00

## 2017-08-21 ENCOUNTER — TELEPHONE (OUTPATIENT)
Dept: CARDIOLOGY | Facility: CLINIC | Age: 72
End: 2017-08-21

## 2018-02-05 ENCOUNTER — OFFICE VISIT (OUTPATIENT)
Dept: CARDIOLOGY | Facility: CLINIC | Age: 73
End: 2018-02-05

## 2018-02-05 VITALS
HEART RATE: 76 BPM | WEIGHT: 214 LBS | DIASTOLIC BLOOD PRESSURE: 78 MMHG | SYSTOLIC BLOOD PRESSURE: 160 MMHG | BODY MASS INDEX: 31.7 KG/M2 | HEIGHT: 69 IN

## 2018-02-05 DIAGNOSIS — I10 ESSENTIAL HYPERTENSION: Primary | ICD-10-CM

## 2018-02-05 DIAGNOSIS — I77.9 BILATERAL CAROTID ARTERY DISEASE (HCC): ICD-10-CM

## 2018-02-05 DIAGNOSIS — E78.01 FAMILIAL HYPERCHOLESTEROLEMIA: ICD-10-CM

## 2018-02-05 DIAGNOSIS — R09.89 BRUIT OF LEFT CAROTID ARTERY: ICD-10-CM

## 2018-02-05 DIAGNOSIS — I25.10 CORONARY ARTERY DISEASE INVOLVING NATIVE CORONARY ARTERY OF NATIVE HEART WITHOUT ANGINA PECTORIS: ICD-10-CM

## 2018-02-05 PROCEDURE — 99214 OFFICE O/P EST MOD 30 MIN: CPT | Performed by: NURSE PRACTITIONER

## 2018-02-05 NOTE — PROGRESS NOTES
Chief Complaint   Patient presents with   • Follow-up     cardiac management, labs and medication refill's per PCP, Patient brought medication's with visit.    • Shortness of Breath     with activity   • Dizziness     at times.       Subjective       Garry Yen is a 72 y.o. male diagnosed with ischemic heart disease in 2013 when he underwent stenting.  He also had more stenting placed in June of 2016.  He was also noted to have significant carotid artery disease and underwent stenting in 2014.  He also has severe dyslipidemia and unable to tolerate any of the cholesterol lowering medications.  Repatha apparently increased his sugar.  He was put on Praluent but cholesterol and triglyceride remained elevated. His cardiac workup in 2016 showed essentialy infarct.  In February 2017, he came to office due to development of new symptoms. Apparently, his lips turned blue and he felt dizzy and lightheaded he started noticing some chest tightness and elbow pain, relieved with nitro. Questran was added for lipid control. A stress test and echocardiogram ordered that showed decrease in LV function and ischemia. Carotid ultrasound was also done and showed 50-69% disease of left and occlusion of right.  He then underwent a cardiac catheterization with stenting of his RCA x2. Left carotid artery was noted to have 50% stenosis. LV function was poor being <30%. A life vest was placed. He was then started on Entresto.  Most recent MUGA scan showed improvement of LVEF to 41% and RVEF to 47% his life vest was discontinued. In July 2017 repeat cardiac workup was advised due to persistent chest pain. No definite ischemia was noted. He continues to be managed medically.   Today he comes to the office for a follow up visit. He reports he has been doing well and not had any chest pain or palpitations. He has shortness of breath no worse since last visit. He has dizziness but overall remains improved since medication changes.     HPI:  as noted above         Cardiac History:    Past Surgical History:   Procedure Laterality Date   • CARDIAC CATHETERIZATION  06/16/2016    FFR of RCA- 0.72- 3.0x22 & 3.0x12 Resolute. FFR of LAD- 0.74- 2.5x8 & 2.5x26 Resolute   • CARDIAC CATHETERIZATION  07/16/2013    Cath-() 70%LAD. 90%RCA-3.5 x 18 & 3.5 x 15 Resolute Stents   • CARDIAC CATHETERIZATION  02/15/2017    85% RCA- 3.5x24 & 3.5x20 Promus. 50% (L) ICA.   • CARDIOTHORACIC PROCEDURE  09/20/2007    A.Cardiolite () Normal   • CARDIOVASCULAR STRESS TEST  09/08/2009    L.Myoview-() Normal   • CARDIOVASCULAR STRESS TEST  03/03/2011    L.Myoview-() Normal   • CARDIOVASCULAR STRESS TEST  08/12/2014    L.Myoview-inferior wall ischemia, Ranexa increased   • CARDIOVASCULAR STRESS TEST  03/28/2016    EF 38%, inferior infarct, tahir-infarct ischemia   • CARDIOVASCULAR STRESS TEST  07/06/2016    EF 33%, WMA inferior wall, fixed defect no ischemia   • CARDIOVASCULAR STRESS TEST  03/28/2016    L. Cardiolite @ [facility]- EF 38%. Inferior Infarct and Ischemia   • CARDIOVASCULAR STRESS TEST  02/17/2006    Cardiolite-()Normal   • CARDIOVASCULAR STRESS TEST  07/06/2016    @[facility]. LAlexis Myoview- EF 33%. Inferior and Small Anterior Infarct.   • CARDIOVASCULAR STRESS TEST  02/08/2017    L. Myoview- EF 30%. Inferior Ischemia.   • CARDIOVASCULAR STRESS TEST  08/17/2017    Lexiscan- no definite ischemia   • CAROTID STENT Left 11/05/2014    - 10x42 Wall Stent   • ECHO - CONVERTED  09/24/2007    Echo-([facility].) EF 60%   • ECHO - CONVERTED  03/03/2011    Echo-() EF65%   • ECHO - CONVERTED  08/12/2014    Echo-EF 40-45%, RVSP 16mmHg.   • ECHO - CONVERTED  08/11/2016    EF 40%. Inferior WMA.   • ECHO - CONVERTED  02/08/2017    EF 30%. Inferior WMA.   • ECHO - CONVERTED  08/17/2017    EF 40-45%, mild MR   • US CAROTID UNILATERAL  03/21/2011    Normnal   • US CAROTID UNILATERAL  10/13/2014    70% (L) ICA.100% (R) ICA   • US  CAROTID UNILATERAL  11/24/2015    50% (L) ICA. 100% (R) ICA   • US CAROTID UNILATERAL  02/08/2017    100% (R) ICA. < 69% (L) ICA       Current Outpatient Prescriptions   Medication Sig Dispense Refill   • Alirocumab (PRALUENT) 150 MG/ML solution pen-injector Inject  under the skin Every 14 (Fourteen) Days.     • amLODIPine (NORVASC) 5 MG tablet 1/2 to 1 tablet daily 30 tablet 8   • aspirin 81 MG EC tablet Increase to twice a day     • carvedilol (COREG) 3.125 MG tablet Take 1 tablet by mouth 2 (Two) Times a Day With Meals. 60 tablet 8   • clopidogrel (PLAVIX) 75 MG tablet Take 75 mg by mouth daily.     • gabapentin (NEURONTIN) 300 MG capsule Take 300 mg by mouth 3 (three) times a day.     • insulin glargine (LANTUS) 100 UNIT/ML injection Inject  under the skin daily.     • metFORMIN (GLUCOPHAGE) 1000 MG tablet Take 1,000 mg by mouth 2 (two) times a day with meals.     • ranitidine (ZANTAC) 150 MG tablet Take 150 mg by mouth 2 (two) times a day.     • ranolazine (RANEXA) 1000 MG 12 hr tablet Take 1,000 mg by mouth Daily.     • sacubitril-valsartan (ENTRESTO) 49-51 MG tablet Take 1 tablet by mouth 2 (Two) Times a Day. 60 tablet 11   • SLO-NIACIN PO Take 1,000 mg by mouth 2 (two) times a day.       No current facility-administered medications for this visit.        Morphine and related and Niacin and related    Past Medical History:   Diagnosis Date   • Bilat. inguinal hernia repair    • CAD (coronary artery disease)    • Cancer     skin cancer removed faced   • Carotid stenosis    • Diabetes mellitus    • Diverticulitis    • GI bleed    • History of knee replacement     Bilat.   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     Social History Main Topics   • Smoking status: Former Smoker     Quit date: 1987   • Smokeless tobacco: Former User     Quit date: 2015   • Alcohol use No   • Drug use: No   • Sexual activity: Not on file     Other Topics Concern   • Not on file     Social History Narrative       Family History   Problem Relation Age of Onset   • Hyperlipidemia Mother    • Pancreatitis Father    • Diabetes Father    • Heart disease Sister    • Heart disease Brother    • Hypertension Daughter    • Hypertension Grandchild        Review of Systems   Constitution: Negative for decreased appetite, fever and weakness.   HENT: Negative for congestion and hoarse voice.    Eyes: Negative for blurred vision and visual disturbance.   Cardiovascular: Negative for chest pain, leg swelling, near-syncope and palpitations.   Respiratory: Positive for shortness of breath (with exertion, no worse). Negative for sleep disturbances due to breathing and wheezing.    Endocrine: Negative for polydipsia, polyphagia and polyuria.   Hematologic/Lymphatic: Negative for bleeding problem. Does not bruise/bleed easily.   Skin: Negative for color change and itching.   Musculoskeletal: Positive for joint pain (no worse, r/t arthritis) and muscle cramps (improved). Negative for myalgias.   Gastrointestinal: Negative for change in bowel habit, melena and nausea.   Genitourinary: Negative for dysuria and hematuria.   Neurological: Positive for dizziness (not often, has improved). Negative for headaches, light-headedness, loss of balance and numbness.   Psychiatric/Behavioral: Negative for altered mental status, depression and memory loss. The patient does not have insomnia and is not nervous/anxious.    Allergic/Immunologic: Negative for hives.      Diabetes- Yes  Thyroid-normal    Objective     /78 (BP Location: Left arm)  Pulse 76  Ht 175.3 cm (69\")  Wt 97.1 kg (214 lb)  BMI 31.6 kg/m2    Physical Exam   Constitutional: He is oriented to " person, place, and time. He appears well-nourished.   HENT:   Head: Normocephalic.   Eyes: Pupils are equal, round, and reactive to light.   Neck: Normal range of motion. No JVD present. Carotid bruit is present.   Cardiovascular: Normal rate, regular rhythm, S1 normal and S2 normal.    No murmur heard.  Slightly loud S2   Pulmonary/Chest: Breath sounds normal. He has no wheezes. He has no rales.   Abdominal: Soft. Bowel sounds are normal. He exhibits no distension. There is no tenderness.   Musculoskeletal: Normal range of motion. He exhibits no edema or tenderness.   Neurological: He is alert and oriented to person, place, and time.   Skin: Skin is warm. No rash noted. No pallor.   Psychiatric: He has a normal mood and affect. His behavior is normal.     Procedures: none today        Assessment/Plan      Garry was seen today for follow-up, shortness of breath and dizziness.    Diagnoses and all orders for this visit:    Essential hypertension    Bilateral carotid artery disease  -     US Carotid Bilateral; Future    Bruit of left carotid artery  -     US Carotid Bilateral; Future    Coronary artery disease involving native coronary artery of native heart without angina pectoris    Familial hypercholesterolemia      He is taking Ranexa 1/2 tablet bid and dizziness has improved. His systolic blood pressure is 160 today. Patient states his blood pressure has been in normal range. He had been taking 1/2 tablet of Norvasc but tablets were crumbling and he is now taking full 5 mg tablet daily. I advised him to continue full tablet and monitor blood pressure. DASH diet also encouraged. No new or worsening symptoms reported. The results of his stress and echo done last year were reviewed which did not show ischemia and LV function was stable. No medication changes made at this time. If blood pressure remains increased will consider increase dose of Entresto. It has been a year since evaluation of carotid artery disease.  "A carotid ultrasound ordered. For lipid management he reports tolerating Praluent well and has added \"another medication every 2 weeks\" with plans to follow up with you for reassessment of labs. I encouraged him on good cardia, low cholesterol diet as well. We will plan to see him back in 6 months or sooner for problems.            Electronically signed by JULIO CÉSAR Castelan,  February 6, 2018 11:37 AM  "

## 2018-02-15 DIAGNOSIS — R09.89 BRUIT OF LEFT CAROTID ARTERY: Primary | ICD-10-CM

## 2018-02-15 DIAGNOSIS — I77.9 BILATERAL CAROTID ARTERY DISEASE (HCC): ICD-10-CM

## 2018-03-20 ENCOUNTER — TELEPHONE (OUTPATIENT)
Dept: CARDIOLOGY | Facility: CLINIC | Age: 73
End: 2018-03-20

## 2018-03-20 NOTE — TELEPHONE ENCOUNTER
[place] pharmacy called requesting refill on Entresto 49/51mg bid, refill on Entresto 49/51mg every 12hrs sent to pharmacy.

## 2018-03-27 ENCOUNTER — PRIOR AUTHORIZATION (OUTPATIENT)
Dept: CARDIOLOGY | Facility: CLINIC | Age: 73
End: 2018-03-27

## 2018-05-31 RX ORDER — CARVEDILOL 3.12 MG/1
TABLET ORAL
Qty: 60 TABLET | Refills: 3 | Status: SHIPPED | OUTPATIENT
Start: 2018-05-31 | End: 2018-08-06 | Stop reason: SDUPTHER

## 2018-07-18 RX ORDER — AMLODIPINE BESYLATE 5 MG/1
TABLET ORAL
Qty: 30 TABLET | Refills: 0 | Status: SHIPPED | OUTPATIENT
Start: 2018-07-18 | End: 2018-08-06 | Stop reason: SDUPTHER

## 2018-08-06 ENCOUNTER — OFFICE VISIT (OUTPATIENT)
Dept: CARDIOLOGY | Facility: CLINIC | Age: 73
End: 2018-08-06

## 2018-08-06 VITALS
HEIGHT: 69 IN | HEART RATE: 68 BPM | SYSTOLIC BLOOD PRESSURE: 150 MMHG | DIASTOLIC BLOOD PRESSURE: 82 MMHG | WEIGHT: 216 LBS | BODY MASS INDEX: 31.99 KG/M2

## 2018-08-06 DIAGNOSIS — R07.89 OTHER CHEST PAIN: ICD-10-CM

## 2018-08-06 DIAGNOSIS — R06.02 SHORTNESS OF BREATH: ICD-10-CM

## 2018-08-06 DIAGNOSIS — I25.118 CORONARY ARTERY DISEASE OF NATIVE ARTERY OF NATIVE HEART WITH STABLE ANGINA PECTORIS (HCC): Primary | ICD-10-CM

## 2018-08-06 DIAGNOSIS — I77.9 BILATERAL CAROTID ARTERY DISEASE (HCC): ICD-10-CM

## 2018-08-06 DIAGNOSIS — E78.2 ELEVATED TRIGLYCERIDES WITH HIGH CHOLESTEROL: ICD-10-CM

## 2018-08-06 DIAGNOSIS — E78.01 FAMILIAL HYPERCHOLESTEROLEMIA: ICD-10-CM

## 2018-08-06 DIAGNOSIS — I10 ESSENTIAL HYPERTENSION: ICD-10-CM

## 2018-08-06 DIAGNOSIS — I73.9 PVD (PERIPHERAL VASCULAR DISEASE) (HCC): ICD-10-CM

## 2018-08-06 DIAGNOSIS — I50.30 CONGESTIVE HEART FAILURE WITH LV DIASTOLIC DYSFUNCTION, NYHA CLASS 2 (HCC): ICD-10-CM

## 2018-08-06 PROCEDURE — 99214 OFFICE O/P EST MOD 30 MIN: CPT | Performed by: NURSE PRACTITIONER

## 2018-08-06 RX ORDER — ISOSORBIDE MONONITRATE 30 MG/1
30 TABLET, EXTENDED RELEASE ORAL DAILY
COMMUNITY
End: 2018-08-06 | Stop reason: SDUPTHER

## 2018-08-06 RX ORDER — GABAPENTIN 600 MG/1
600 TABLET ORAL 4 TIMES DAILY
COMMUNITY

## 2018-08-06 RX ORDER — CARVEDILOL 3.12 MG/1
3.12 TABLET ORAL 2 TIMES DAILY WITH MEALS
Qty: 60 TABLET | Refills: 3 | Status: SHIPPED | OUTPATIENT
Start: 2018-08-06 | End: 2018-09-17

## 2018-08-06 RX ORDER — CLOPIDOGREL BISULFATE 75 MG/1
75 TABLET ORAL DAILY
Qty: 90 TABLET | Refills: 3 | Status: SHIPPED | OUTPATIENT
Start: 2018-08-06 | End: 2019-11-11 | Stop reason: SDUPTHER

## 2018-08-06 RX ORDER — AMLODIPINE BESYLATE 5 MG/1
5 TABLET ORAL DAILY
Qty: 90 TABLET | Refills: 3 | Status: SHIPPED | OUTPATIENT
Start: 2018-08-06 | End: 2019-07-30 | Stop reason: SDUPTHER

## 2018-08-06 RX ORDER — RANOLAZINE 1000 MG/1
1000 TABLET, EXTENDED RELEASE ORAL EVERY 12 HOURS SCHEDULED
Qty: 180 TABLET | Refills: 3 | Status: SHIPPED | OUTPATIENT
Start: 2018-08-06 | End: 2019-11-11 | Stop reason: SDUPTHER

## 2018-08-06 RX ORDER — ISOSORBIDE MONONITRATE 30 MG/1
TABLET, EXTENDED RELEASE ORAL
Qty: 180 TABLET | Refills: 3 | Status: SHIPPED | OUTPATIENT
Start: 2018-08-06 | End: 2019-09-04 | Stop reason: DRUGHIGH

## 2018-08-06 NOTE — PROGRESS NOTES
Chief Complaint   Patient presents with   • Follow-up     For cardiac management. Needs refills on cardiac for 90 days to [facility]. Labs per PCP a few months ago. Has not been doing praluent for the past month due to insurance. Hasn't had meds this morning.    • Chest Pain     Has noticed a few left sided chest pains for the past month. Worse with exertion- has been doing a lot of gardening.        Subjective       Garry Yen is a 73 y.o. male diagnosed with ischemic heart disease and underwent stenting in 2013, again in June of 2016.  Significant LICA disease was noted in 2014 and underwent stenting. He has familial hypercholesterolemia and unable to tolerate statins. Repatha apparently increased his sugar.  He then took Praluent but cholesterol and triglyceride remained elevated, therefore insurance denied coverage. Questran was tried but did not lower lipids.  In February 2017, he came to office due to development of new symptoms. Apparently, his lips turned blue and he felt dizzy and lightheaded he started noticing some chest tightness and elbow pain, relieved with nitro. A stress test and echocardiogram ordered that showed decrease in LV function and ischemia. Carotid ultrasound was also done and showed 50-69% disease of left and occlusion of right.  He then underwent a cardiac catheterization with stenting of his RCA x2. Left carotid artery was noted to have 50% stenosis. LV function was poor being <30%. A life vest was placed and Entresto started. MUGA scan showed improvement of LVEF to 41% and RVEF to 47%, life vest discontinued and he continued medical management. In July 2017, he had more chest pain. Stress test did not show definite ischemia. At that time Ranexa dose was decreased and dizziness improved. He later resumed higher dose and denies reoccurrence of significant dizziness. In July 2018, he was admitted to [facility] with GI bleed and received transfusion. Repeat labs were stable  according to patient.   Today he comes to the office for a follow up visit with following complaint:    Chest Pain    This is a new problem. The current episode started 1 to 4 weeks ago. The onset quality is gradual. The problem occurs daily. The problem has been gradually worsening. The pain is present in the lateral region. The pain is moderate. The quality of the pain is described as sharp. The pain does not radiate. Associated symptoms include back pain, diaphoresis, dizziness, malaise/fatigue (same), numbness (lower legs, not a new symptom) and shortness of breath. Pertinent negatives include no abdominal pain, cough or nausea. The pain is aggravated by exertion. He has tried rest and nitroglycerin for the symptoms. The treatment provided significant relief.        Cardiac History:    Past Surgical History:   Procedure Laterality Date   • CARDIAC CATHETERIZATION  06/16/2016    FFR of RCA- 0.72- 3.0x22 & 3.0x12 Resolute. FFR of LAD- 0.74- 2.5x8 & 2.5x26 Resolute   • CARDIAC CATHETERIZATION  07/16/2013    Cath-() 70%LAD. 90%RCA-3.5 x 18 & 3.5 x 15 Resolute Stents   • CARDIAC CATHETERIZATION  02/15/2017    85% RCA- 3.5x24 & 3.5x20 Promus. 50% (L) ICA.   • CARDIOTHORACIC PROCEDURE  09/20/2007    ANUCardiodalton () Normal   • CARDIOVASCULAR STRESS TEST  09/08/2009    Ana-() Normal   • CARDIOVASCULAR STRESS TEST  03/03/2011    Ana-() Normal   • CARDIOVASCULAR STRESS TEST  08/12/2014    L.Myoview-inferior wall ischemia, Ranexa increased   • CARDIOVASCULAR STRESS TEST  03/28/2016    EF 38%, inferior infarct, tahir-infarct ischemia   • CARDIOVASCULAR STRESS TEST  07/06/2016    EF 33%, WMA inferior wall, fixed defect no ischemia   • CARDIOVASCULAR STRESS TEST  03/28/2016    HIMANSHU Cardiolite @ [facility]- EF 38%. Inferior Infarct and Ischemia   • CARDIOVASCULAR STRESS TEST  02/17/2006    Cardiolite-()Normal   • CARDIOVASCULAR STRESS TEST  07/06/2016    @[facility]. L.  Myoview- EF 33%. Inferior and Small Anterior Infarct.   • CARDIOVASCULAR STRESS TEST  02/08/2017    L. Myoview- EF 30%. Inferior Ischemia.   • CARDIOVASCULAR STRESS TEST  08/17/2017    Lexiscan- no definite ischemia   • CAROTID STENT Left 11/05/2014    - 10x42 Wall Stent   • ECHO - CONVERTED  09/24/2007    Echo-([facility] EF 60%   • ECHO - CONVERTED  03/03/2011    Echo-() EF65%   • ECHO - CONVERTED  08/12/2014    Echo-EF 40-45%, RVSP 16mmHg.   • ECHO - CONVERTED  08/11/2016    EF 40%. Inferior WMA.   • ECHO - CONVERTED  02/08/2017    EF 30%. Inferior WMA.   • ECHO - CONVERTED  08/17/2017    EF 40-45%, mild MR   • OTHER SURGICAL HISTORY  02/21/2018    occlusion RICKEY, patent stent LICA with no evidence of significant stenosis felt to be < 50%   • US CAROTID UNILATERAL  03/21/2011    Normnal   • US CAROTID UNILATERAL  10/13/2014    70% (L) ICA.100% (R) ICA   • US CAROTID UNILATERAL  11/24/2015    50% (L) ICA. 100% (R) ICA   • US CAROTID UNILATERAL  02/08/2017    100% (R) ICA. < 69% (L) ICA   • US CAROTID UNILATERAL  02/13/2018    occlusion of RICKEY, LICA 50-69%, common carotid 50-69%       Current Outpatient Prescriptions   Medication Sig Dispense Refill   • amLODIPine (NORVASC) 5 MG tablet Take 1 tablet by mouth Daily. 90 tablet 3   • aspirin 81 MG EC tablet Increase to twice a day     • carvedilol (COREG) 3.125 MG tablet Take 1 tablet by mouth 2 (Two) Times a Day With Meals. 60 tablet 3   • clopidogrel (PLAVIX) 75 MG tablet Take 1 tablet by mouth Daily. 90 tablet 3   • gabapentin (NEURONTIN) 600 MG tablet Take 600 mg by mouth 4 (Four) Times a Day.     • insulin glargine (LANTUS) 100 UNIT/ML injection Inject  under the skin daily.     • isosorbide mononitrate (IMDUR) 30 MG 24 hr tablet Increase to twice a day 180 tablet 3   • metFORMIN (GLUCOPHAGE) 1000 MG tablet Take 1,000 mg by mouth 2 (two) times a day with meals.     • ranitidine (ZANTAC) 150 MG tablet Take 150 mg by mouth 2 (two) times a day.      • ranolazine (RANEXA) 1000 MG 12 hr tablet Take 1 tablet by mouth Every 12 (Twelve) Hours. 180 tablet 3   • sacubitril-valsartan (ENTRESTO) 49-51 MG tablet Take 1 tablet by mouth Every 12 (Twelve) Hours. 180 tablet 3   • SLO-NIACIN PO Take 1,000 mg by mouth 2 (two) times a day.       No current facility-administered medications for this visit.        Morphine and related and Niacin and related    Past Medical History:   Diagnosis Date   • Bilat. inguinal hernia repair    • CAD (coronary artery disease)    • Cancer (CMS/HCC)     skin cancer removed faced   • Carotid stenosis    • Diabetes mellitus (CMS/HCC)    • Diverticulitis    • GI bleed    • History of knee replacement     Bilat.   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     Social History Main Topics   • Smoking status: Former Smoker     Quit date: 1987   • Smokeless tobacco: Former User     Quit date: 2015   • Alcohol use No   • Drug use: No   • Sexual activity: Not on file     Other Topics Concern   • Not on file     Social History Narrative   • No narrative on file       Family History   Problem Relation Age of Onset   • Hyperlipidemia Mother    • Pancreatitis Father    • Diabetes Father    • Heart disease Sister    • Heart disease Brother    • Hypertension Daughter    • Hypertension Grandchild        Review of Systems   Constitution: Positive for diaphoresis and malaise/fatigue (same). Negative for decreased appetite.   HENT: Negative for congestion, hoarse voice and nosebleeds.    Eyes: Negative for discharge, pain and redness.   Cardiovascular: Positive for chest pain.   Respiratory: Positive for shortness of breath. Negative for cough and sleep  "disturbances due to breathing.    Endocrine: Positive for heat intolerance. Negative for cold intolerance.   Hematologic/Lymphatic: Negative for bleeding problem. Bruises/bleeds easily.   Skin: Negative for dry skin and itching.   Musculoskeletal: Positive for back pain, joint pain, myalgias and stiffness. Negative for falls.   Gastrointestinal: Positive for bloating (relates to history of diverticulosis, not a new symptom) and heartburn. Negative for abdominal pain, change in bowel habit, dysphagia, melena (not since discharge from hospital in July) and nausea.   Genitourinary: Negative for dysuria and hematuria.   Neurological: Positive for dizziness, light-headedness and numbness (lower legs, not a new symptom). Negative for tremors.        Objective     /82   Pulse 68   Ht 175.3 cm (69\")   Wt 98 kg (216 lb)   BMI 31.90 kg/m²     Physical Exam   Constitutional: He is oriented to person, place, and time. He appears well-nourished. No distress.   HENT:   Head: Normocephalic.   Eyes: Pupils are equal, round, and reactive to light. Conjunctivae are normal.   Neck: Normal range of motion. Neck supple. Carotid bruit is present.   Cardiovascular: Normal rate, regular rhythm and S1 normal.    Murmur heard.  Pulses:       Radial pulses are 2+ on the right side, and 2+ on the left side.   Loud S2   Pulmonary/Chest: Breath sounds normal. He has no wheezes. He has no rales.   Abdominal: Soft. Bowel sounds are normal. He exhibits distension. There is no tenderness.   Musculoskeletal: Normal range of motion. He exhibits no edema.   Neurological: He is alert and oriented to person, place, and time.   Skin: Skin is warm and dry. No pallor.   Psychiatric: He has a normal mood and affect.   Vitals reviewed.       Procedures: none today        Assessment/Plan      Garry was seen today for follow-up and chest pain.    Diagnoses and all orders for this visit:    Coronary artery disease of native artery of native heart with " stable angina pectoris (CMS/HCC)  -     Stress Test With Myocardial Perfusion One Day; Future  -     Adult Transthoracic Echo Complete W/ Cont if Necessary Per Protocol; Future    Essential hypertension  -     Stress Test With Myocardial Perfusion One Day; Future  -     Adult Transthoracic Echo Complete W/ Cont if Necessary Per Protocol; Future    Familial hypercholesterolemia  -     Stress Test With Myocardial Perfusion One Day; Future  -     Adult Transthoracic Echo Complete W/ Cont if Necessary Per Protocol; Future    PVD (peripheral vascular disease) (CMS/HCC)    Bilateral carotid artery disease (CMS/HCC)    Elevated triglycerides with high cholesterol  -     Stress Test With Myocardial Perfusion One Day; Future  -     Adult Transthoracic Echo Complete W/ Cont if Necessary Per Protocol; Future    Congestive heart failure with LV diastolic dysfunction, NYHA class 2 (CMS/HCC)  -     Stress Test With Myocardial Perfusion One Day; Future  -     Adult Transthoracic Echo Complete W/ Cont if Necessary Per Protocol; Future    Other chest pain  -     Stress Test With Myocardial Perfusion One Day; Future  -     Adult Transthoracic Echo Complete W/ Cont if Necessary Per Protocol; Future    Shortness of breath  -     Stress Test With Myocardial Perfusion One Day; Future  -     Adult Transthoracic Echo Complete W/ Cont if Necessary Per Protocol; Future    Other orders  -     sacubitril-valsartan (ENTRESTO) 49-51 MG tablet; Take 1 tablet by mouth Every 12 (Twelve) Hours.  -     ranolazine (RANEXA) 1000 MG 12 hr tablet; Take 1 tablet by mouth Every 12 (Twelve) Hours.  -     isosorbide mononitrate (IMDUR) 30 MG 24 hr tablet; Increase to twice a day  -     clopidogrel (PLAVIX) 75 MG tablet; Take 1 tablet by mouth Daily.  -     carvedilol (COREG) 3.125 MG tablet; Take 1 tablet by mouth 2 (Two) Times a Day With Meals.  -     amLODIPine (NORVASC) 5 MG tablet; Take 1 tablet by mouth Daily.    Mr. Yen is concerned over development  of chest pain as noted above. Given his cardiac risk and known CAD, a repeat stress and echocardiogram advised to look for any ischemia and/or change in LV function.     His blood pressure is slightly increased. He reports he has not yet had morning medication. We will also try increasing the dose of daily nitrate, Imdur, to bid. He also has nitrostat to use as needed for chest pain. If symptoms significant he understands to go to ER.     No other medication changes made today. Refills given as noted.     He follows with you for management of labs. He is currently off Praluent due to not responding to therapy. It is noted he did not benefit from Questran, Repatha , and could not tolerate statins. I am not sure if has tried Zetia or other fenofibrates. I advised him to continue to follow with you for management of labs including lipid panel. Please forward a copy of lab results to the office as available.     Patient's Body mass index is 31.9 kg/m². BMI is above normal parameters. Recommendations include: nutrition counseling. Diabetic heart healthy diet encouraged.     We reviewed his most recent carotid ultrasound and CT of carotids, which we will continue medical management and monitoring.      A 6 month follow up visit scheduled. Further recommendations based on test results. Please call sooner for cardiac concerns.            Electronically signed by JULIO CÉSAR Castelan,  August 6, 2018 10:56 AM

## 2018-08-14 ENCOUNTER — HOSPITAL ENCOUNTER (OUTPATIENT)
Dept: CARDIOLOGY | Facility: HOSPITAL | Age: 73
Discharge: HOME | End: 2018-08-14

## 2018-08-14 DIAGNOSIS — R06.02 SHORTNESS OF BREATH: ICD-10-CM

## 2018-08-14 DIAGNOSIS — E78.2 ELEVATED TRIGLYCERIDES WITH HIGH CHOLESTEROL: ICD-10-CM

## 2018-08-14 DIAGNOSIS — I25.118 CORONARY ARTERY DISEASE OF NATIVE ARTERY OF NATIVE HEART WITH STABLE ANGINA PECTORIS (HCC): ICD-10-CM

## 2018-08-14 DIAGNOSIS — I10 ESSENTIAL HYPERTENSION: ICD-10-CM

## 2018-08-14 DIAGNOSIS — R07.89 OTHER CHEST PAIN: ICD-10-CM

## 2018-08-14 DIAGNOSIS — I50.30 CONGESTIVE HEART FAILURE WITH LV DIASTOLIC DYSFUNCTION, NYHA CLASS 2 (HCC): ICD-10-CM

## 2018-08-14 DIAGNOSIS — E78.01 FAMILIAL HYPERCHOLESTEROLEMIA: ICD-10-CM

## 2018-08-14 LAB
MAXIMAL PREDICTED HEART RATE: 149 BPM
MAXIMAL PREDICTED HEART RATE: 149 BPM
STRESS TARGET HR: 127 BPM
STRESS TARGET HR: 127 BPM

## 2018-08-14 PROCEDURE — 93306 TTE W/DOPPLER COMPLETE: CPT | Performed by: INTERNAL MEDICINE

## 2018-08-14 PROCEDURE — 93017 CV STRESS TEST TRACING ONLY: CPT

## 2018-08-14 PROCEDURE — 93306 TTE W/DOPPLER COMPLETE: CPT

## 2018-08-14 PROCEDURE — 25010000002 REGADENOSON 0.4 MG/5ML SOLUTION: Performed by: INTERNAL MEDICINE

## 2018-08-14 PROCEDURE — 0 TECHNETIUM SESTAMIBI: Performed by: INTERNAL MEDICINE

## 2018-08-14 PROCEDURE — 93018 CV STRESS TEST I&R ONLY: CPT | Performed by: INTERNAL MEDICINE

## 2018-08-14 PROCEDURE — 78452 HT MUSCLE IMAGE SPECT MULT: CPT

## 2018-08-14 PROCEDURE — 78452 HT MUSCLE IMAGE SPECT MULT: CPT | Performed by: INTERNAL MEDICINE

## 2018-08-14 PROCEDURE — A9500 TC99M SESTAMIBI: HCPCS | Performed by: INTERNAL MEDICINE

## 2018-08-14 RX ADMIN — TECHNETIUM TC 99M SESTAMIBI 1 DOSE: 1 INJECTION INTRAVENOUS at 12:04

## 2018-08-14 RX ADMIN — TECHNETIUM TC 99M SESTAMIBI 1 DOSE: 1 INJECTION INTRAVENOUS at 12:05

## 2018-08-14 RX ADMIN — REGADENOSON 0.4 MG: 0.08 INJECTION, SOLUTION INTRAVENOUS at 12:04

## 2018-08-16 ENCOUNTER — TELEPHONE (OUTPATIENT)
Dept: CARDIOLOGY | Facility: CLINIC | Age: 73
End: 2018-08-16

## 2018-08-16 DIAGNOSIS — I25.118 ATHEROSCLEROSIS OF NATIVE CORONARY ARTERY OF NATIVE HEART WITH STABLE ANGINA PECTORIS (HCC): Primary | ICD-10-CM

## 2018-08-16 DIAGNOSIS — I50.30 CONGESTIVE HEART FAILURE WITH LEFT VENTRICULAR DIASTOLIC DYSFUNCTION, NYHA CLASS 2 (HCC): ICD-10-CM

## 2018-08-16 NOTE — TELEPHONE ENCOUNTER
Esperanza, per stress test recommendations.  Dr. Kaplan reported since drop in EF, patient may need cardiac cath.   Do you recommend to schedule a cath?       EF 31% both during stress and rest.  No significant ischemia.  Inferior wall infarct.

## 2018-08-16 NOTE — TELEPHONE ENCOUNTER
MrAlexis Graham worse life vest in past but after more stenting last year and addition of Entresto his EF improved. He has developed more symptoms prompting stress and echo. Since no ischemia, would you advise referral for ICD?

## 2018-08-20 NOTE — TELEPHONE ENCOUNTER
I spoke with patient's wife, Katerina.  She is aware of stress and echo results and recommendations (significant decrease in EF compared to previous test, recommend elective cardiac cath.    Katerina was currently driving to [place] and and she will call our office back regarding scheduling cath.

## 2018-08-21 ENCOUNTER — TELEPHONE (OUTPATIENT)
Dept: CARDIOLOGY | Facility: CLINIC | Age: 73
End: 2018-08-21

## 2018-08-21 NOTE — TELEPHONE ENCOUNTER
[facility] called reporting that Dr. Arredondo's office has sent in a script for Lotrel yesterday but pt is taking Entresto and Amlodipine. Advised that patient cannot take the ace inhibitor while on entresto and that patient has reported to us that he is taking Norvasc separately. Advised to contact Dr. Arredondo's office and let them know that the patient is already on these two meds so Lotrel would not be appropriate. I tried to call patient to advise him not to take Lotrel, no answer.

## 2018-08-22 NOTE — TELEPHONE ENCOUNTER
I spoke with patient's wife, Katerina.  Patient is not taking Lotrel.  Patient is taking the Entresto and amlodipine.

## 2018-08-23 NOTE — TELEPHONE ENCOUNTER
FYI-Patient does want to proceed with referral to Dr. Ruiz for radial approach cardiac catheterization and possible ICD.

## 2018-09-17 ENCOUNTER — OFFICE VISIT (OUTPATIENT)
Dept: CARDIOLOGY | Facility: CLINIC | Age: 73
End: 2018-09-17

## 2018-09-17 VITALS
BODY MASS INDEX: 32.29 KG/M2 | DIASTOLIC BLOOD PRESSURE: 68 MMHG | HEART RATE: 76 BPM | WEIGHT: 218 LBS | HEIGHT: 69 IN | SYSTOLIC BLOOD PRESSURE: 158 MMHG

## 2018-09-17 DIAGNOSIS — E78.01 FAMILIAL HYPERCHOLESTEROLEMIA: ICD-10-CM

## 2018-09-17 DIAGNOSIS — I25.9 IHD (ISCHEMIC HEART DISEASE): Primary | ICD-10-CM

## 2018-09-17 DIAGNOSIS — I77.9 BILATERAL CAROTID ARTERY DISEASE (HCC): ICD-10-CM

## 2018-09-17 DIAGNOSIS — I73.9 PVD (PERIPHERAL VASCULAR DISEASE) (HCC): ICD-10-CM

## 2018-09-17 DIAGNOSIS — I10 ESSENTIAL HYPERTENSION: ICD-10-CM

## 2018-09-17 PROCEDURE — 99213 OFFICE O/P EST LOW 20 MIN: CPT | Performed by: NURSE PRACTITIONER

## 2018-09-17 RX ORDER — CARVEDILOL 6.25 MG/1
6.25 TABLET ORAL 2 TIMES DAILY
Qty: 60 TABLET | Refills: 11 | Status: SHIPPED | OUTPATIENT
Start: 2018-09-17 | End: 2019-10-04 | Stop reason: SDUPTHER

## 2018-09-17 NOTE — PROGRESS NOTES
Chief Complaint   Patient presents with   • Follow-up     Pt was referred to Dr. Ruiz for radial cath with stenting to the mid RCA on 09/07/18.    • Med Refill     Doesn't need any refills. Didn't bring med list- went over verbally.        Subjective       Garry Yne is a 73 y.o. male diagnosed with ischemic heart disease and underwent stenting in 2013, again in June of 2016.  Significant LICA disease was noted in 2014 and underwent stenting. He has familial hypercholesterolemia and unable to tolerate statins. Repatha apparently increased his sugar. With Praluent his cholesterol and triglyceride remained elevated, therefore insurance denied coverage. Questran was tried but did not lower lipids.  In February 2017, he development more symptoms for which a stress test and echocardiogram were done. Decrease in LV function and ischemia was noted. Carotid ultrasound showed 50-69% disease of left and occlusion of right. On 2/17/18, a cardiac catheterization with stenting of his RCA x2 was done. Left carotid artery had 50% stenosis. LV function was <30%,  life vest placed and Entresto started. MUGA scan showed improvement of LVEF to 41% and RVEF to 47%, therefore life vest discontinued. In July 2017, he had more chest pain. Stress test did not show definite ischemia. Ranexa dose was decreased and dizziness improved. He later resumed higher dose and tolerated well. In July 2018, he was admitted to [facility] with GI bleed and received transfusion. Repeat labs were stable according to patient. At his 8/6/18 visit, he reported more chest pain. Stress and echo repeated with inferior wall infarct, no ischemia, but decrease in EF.  On 9/7/18, cardiac cath revealed significant disease of Mid RCA with placement of Resolute stent. LVEF of 40-45^% noted.     Today he comes to the office for a hospital follow up visit. He denies chest pain or palpitations. He reports shortness of breath is no better or no worse. His wife states that  "some days he \"over does it\", will check BP and it will \"be up\". After he lays down and puts his oxygen on for awhile his blood pressure will be \"almost low\". No recent medication changes reported. He continues to take Plavix and aspirin.     HPI     Cardiac History:    Past Surgical History:   Procedure Laterality Date   • CARDIAC CATHETERIZATION  06/16/2016    FFR of RCA- 0.72- 3.0x22 & 3.0x12 Resolute. FFR of LAD- 0.74- 2.5x8 & 2.5x26 Resolute   • CARDIAC CATHETERIZATION  07/16/2013    Cath-() 70%LAD. 90%RCA-3.5 x 18 & 3.5 x 15 Resolute Stents   • CARDIAC CATHETERIZATION  02/15/2017    85% RCA- 3.5x24 & 3.5x20 Promus. 50% (L) ICA.   • CARDIOTHORACIC PROCEDURE  09/20/2007    A.Cardiolite () Normal   • CARDIOVASCULAR STRESS TEST  09/08/2009    L.Myoview-() Normal   • CARDIOVASCULAR STRESS TEST  03/03/2011    L.Myoview-() Normal   • CARDIOVASCULAR STRESS TEST  08/12/2014    L.Myoview-inferior wall ischemia, Ranexa increased   • CARDIOVASCULAR STRESS TEST  03/28/2016    EF 38%, inferior infarct, tahir-infarct ischemia   • CARDIOVASCULAR STRESS TEST  07/06/2016    EF 33%, WMA inferior wall, fixed defect no ischemia   • CARDIOVASCULAR STRESS TEST  03/28/2016    L. Cardiolite @ [facility]- EF 38%. Inferior Infarct and Ischemia   • CARDIOVASCULAR STRESS TEST  02/17/2006    Cardiolite-()Normal   • CARDIOVASCULAR STRESS TEST  07/06/2016    @[facility]. L. Myoview- EF 33%. Inferior and Small Anterior Infarct.   • CARDIOVASCULAR STRESS TEST  02/08/2017    L. Myoview- EF 30%. Inferior Ischemia.   • CARDIOVASCULAR STRESS TEST  08/17/2017    Lexiscan- no definite ischemia   • CARDIOVASCULAR STRESS TEST  08/14/2018    L. Cardiolite- EF 31 %. Inferior Infarct.   • CAROTID STENT Left 11/05/2014    - 10x42 Wall Stent   • CATH LAB PROCEDURE  09/07/2018    Dr. Marcial- 3.5 x 12 Resolute stent RCA, LVEF 40-45%   • ECHO - CONVERTED  09/24/2007    Echo-([facility]) EF 60%   • ECHO - " CONVERTED  03/03/2011    Echo-() EF65%   • ECHO - CONVERTED  08/12/2014    Echo-EF 40-45%, RVSP 16mmHg.   • ECHO - CONVERTED  08/11/2016    EF 40%. Inferior WMA.   • ECHO - CONVERTED  02/08/2017    EF 30%. Inferior WMA.   • ECHO - CONVERTED  08/17/2017    EF 40-45%, mild MR   • ECHO - CONVERTED  08/14/2018    TLS. EF 30-35%. Inferior WMA.   • US CAROTID UNILATERAL  03/21/2011    Normnal   • US CAROTID UNILATERAL  10/13/2014    70% (L) ICA.100% (R) ICA   • US CAROTID UNILATERAL  11/24/2015    50% (L) ICA. 100% (R) ICA   • US CAROTID UNILATERAL  02/08/2017    100% (R) ICA. < 69% (L) ICA   • US CAROTID UNILATERAL  02/13/2018    occlusion of RICKEY, LICA 50-69%, common carotid 50-69%       Current Outpatient Prescriptions   Medication Sig Dispense Refill   • amLODIPine (NORVASC) 5 MG tablet Take 1 tablet by mouth Daily. 90 tablet 3   • aspirin 81 MG EC tablet Increase to twice a day     • clopidogrel (PLAVIX) 75 MG tablet Take 1 tablet by mouth Daily. 90 tablet 3   • Dulaglutide (TRULICITY SC) Inject  under the skin into the appropriate area as directed 1 (One) Time Per Week.     • gabapentin (NEURONTIN) 600 MG tablet Take 600 mg by mouth 4 (Four) Times a Day.     • insulin glargine (LANTUS) 100 UNIT/ML injection Inject  under the skin daily.     • isosorbide mononitrate (IMDUR) 30 MG 24 hr tablet Increase to twice a day (Patient taking differently: Daily. Increase to twice a day) 180 tablet 3   • metFORMIN (GLUCOPHAGE) 1000 MG tablet Take 1,000 mg by mouth 2 (two) times a day with meals.     • ranitidine (ZANTAC) 150 MG tablet Take 150 mg by mouth 2 (two) times a day.     • ranolazine (RANEXA) 1000 MG 12 hr tablet Take 1 tablet by mouth Every 12 (Twelve) Hours. 180 tablet 3   • sacubitril-valsartan (ENTRESTO) 49-51 MG tablet Take 1 tablet by mouth Every 12 (Twelve) Hours. 180 tablet 3   • SLO-NIACIN PO Take 1,000 mg by mouth 2 (two) times a day.     • carvedilol (COREG) 6.25 MG tablet Take 1 tablet by mouth 2  (Two) Times a Day. 60 tablet 11     No current facility-administered medications for this visit.        Morphine and related and Niacin and related    Past Medical History:   Diagnosis Date   • Bilat. inguinal hernia repair    • CAD (coronary artery disease)    • Cancer (CMS/HCC)     skin cancer removed faced   • Carotid stenosis    • Diabetes mellitus (CMS/HCC)    • Diverticulitis    • GI bleed    • History of knee replacement     Bilat.   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     Social History Main Topics   • Smoking status: Former Smoker     Quit date: 1987   • Smokeless tobacco: Former User     Quit date: 2015   • Alcohol use No   • Drug use: No   • Sexual activity: Not on file     Other Topics Concern   • Not on file     Social History Narrative   • No narrative on file       Family History   Problem Relation Age of Onset   • Hyperlipidemia Mother    • Pancreatitis Father    • Diabetes Father    • Heart disease Sister    • Heart disease Brother    • Hypertension Daughter    • Hypertension Grandchild        Review of Systems   Constitution: Negative for decreased appetite, diaphoresis and weakness.   HENT: Negative for congestion, hoarse voice and nosebleeds.    Eyes: Negative for redness and visual disturbance.   Cardiovascular: Positive for dyspnea on exertion. Negative for chest pain, leg swelling, near-syncope and palpitations.   Respiratory: Negative for cough, shortness of breath and sleep disturbances due to breathing (uses oxygen).    Endocrine: Negative for polydipsia, polyphagia and polyuria.   Hematologic/Lymphatic: Negative for bleeding problem. Bruises/bleeds easily.   Skin: Negative for  "color change, dry skin and itching.   Musculoskeletal: Positive for arthritis. Negative for muscle cramps, muscle weakness and myalgias.   Gastrointestinal: Positive for heartburn (only with certain foods, recently wine has caused). Negative for change in bowel habit, melena and nausea.   Genitourinary: Negative for dysuria and hematuria.   Neurological: Negative for dizziness, headaches and light-headedness.   Psychiatric/Behavioral: Negative for memory loss. The patient is not nervous/anxious.       Objective     /68   Pulse 76   Ht 175.3 cm (69\")   Wt 98.9 kg (218 lb)   BMI 32.19 kg/m²     Physical Exam   Constitutional: He is oriented to person, place, and time. He appears well-nourished. No distress.   HENT:   Head: Normocephalic.   Eyes: Pupils are equal, round, and reactive to light. Conjunctivae are normal.   Neck: Normal range of motion. Neck supple. Carotid bruit is present (not a new finding).   Cardiovascular: Normal rate, regular rhythm and S1 normal.    No murmur heard.  Loud S2   Pulmonary/Chest: Breath sounds normal. He has no wheezes. He has no rales. He exhibits no tenderness.   Abdominal: Soft. Bowel sounds are normal. He exhibits no distension. There is no tenderness.   Musculoskeletal: Normal range of motion. He exhibits no edema or tenderness.   Neurological: He is alert and oriented to person, place, and time.   Skin: Skin is warm and dry. No pallor.   Radial access site for cardiac cath well healed.    Psychiatric: He has a normal mood and affect. His behavior is normal.   Vitals reviewed.     Procedures: none today        Assessment/Plan      Garry was seen today for follow-up and med refill.    Diagnoses and all orders for this visit:    IHD (ischemic heart disease)    Familial hypercholesterolemia    Essential hypertension    Bilateral carotid artery disease (CMS/HCC)    PVD (peripheral vascular disease) (CMS/HCC)    Other orders  -     carvedilol (COREG) 6.25 MG tablet; Take 1 " "tablet by mouth 2 (Two) Times a Day.      He is currently trying red Wine for cholesterol, but not tolerating well due to causing heartburn. I advised him to follow up with you for further recommendations and management of labs. I also asked if he had tried Zetia in the past which he states he was not able to tolerate due to causing stomach problems.  Patient's Body mass index is 32.19 kg/m². BMI is above normal parameters. Recommendations include: nutrition counseling. Mediterranean and DASH diet encouraged.      The report of his recent cardiac cath reviewed. With moderate exertion, his blood pressure will increase 150-160 systolic according to home monitor and heart \"pounds quicker\". We will try an increase dose of Coreg. We also discussed physical activity without overly exerting his self. He agrees to try to better monitor his physical activity. With normal daily walking he denies symptoms.   If BP remains increased, consider increase dose of Entresto. I want to avoid to low of BP due to carotid artery disease.     Next visit, a repeat carotid ultrasound of left will be considered. Currently, he denies TIA type symptoms but understands to call should any concerns develop.     He is taking Plavix and aspirin without any signs of bleeding or increased bruising. Continue same.     A 4 month follow up visit scheduled at [facility]. Please call sooner for any cardiac concerns.            Electronically signed by JULIO CÉSAR Castelan,  September 18, 2018 7:45 AM  "

## 2019-03-06 ENCOUNTER — OFFICE VISIT (OUTPATIENT)
Dept: CARDIOLOGY | Facility: CLINIC | Age: 74
End: 2019-03-06

## 2019-03-06 VITALS
HEART RATE: 68 BPM | BODY MASS INDEX: 32.44 KG/M2 | DIASTOLIC BLOOD PRESSURE: 70 MMHG | HEIGHT: 69 IN | SYSTOLIC BLOOD PRESSURE: 160 MMHG | WEIGHT: 219 LBS

## 2019-03-06 DIAGNOSIS — E11.8 TYPE 2 DIABETES MELLITUS WITH COMPLICATION, WITH LONG-TERM CURRENT USE OF INSULIN (HCC): ICD-10-CM

## 2019-03-06 DIAGNOSIS — I10 ESSENTIAL HYPERTENSION: ICD-10-CM

## 2019-03-06 DIAGNOSIS — I65.23 BILATERAL CAROTID ARTERY STENOSIS: ICD-10-CM

## 2019-03-06 DIAGNOSIS — Z79.4 TYPE 2 DIABETES MELLITUS WITH COMPLICATION, WITH LONG-TERM CURRENT USE OF INSULIN (HCC): ICD-10-CM

## 2019-03-06 DIAGNOSIS — I50.30 CONGESTIVE HEART FAILURE WITH LV DIASTOLIC DYSFUNCTION, NYHA CLASS 2 (HCC): ICD-10-CM

## 2019-03-06 DIAGNOSIS — E78.2 ELEVATED TRIGLYCERIDES WITH HIGH CHOLESTEROL: ICD-10-CM

## 2019-03-06 DIAGNOSIS — E78.01 FAMILIAL HYPERCHOLESTEROLEMIA: ICD-10-CM

## 2019-03-06 DIAGNOSIS — I25.118 CORONARY ARTERY DISEASE OF NATIVE ARTERY OF NATIVE HEART WITH STABLE ANGINA PECTORIS (HCC): Primary | ICD-10-CM

## 2019-03-06 PROCEDURE — 99214 OFFICE O/P EST MOD 30 MIN: CPT | Performed by: NURSE PRACTITIONER

## 2019-03-06 NOTE — PROGRESS NOTES
Chief Complaint   Patient presents with   • Follow-up     Cardiac management. Last labs 3 months ago per PCP.   • Shortness of Breath     Has with exertion, he feels about the same as before.   • Chest Pain     He is having occasional sharp pains to left upper chest with exertion, pain will occasionally radiate to left arm. He reports pain is relieved with Nitro.   • Dizziness     Having more with exertion, will occasionally have some nausea.       Subjective       Garry Yen is a 73 y.o. male with HTN, familial hypercholesterolemia, diabetes and IHD who underwent stenting in 2103, 2016, 2017 and again on 9/7/2018 after he developed more chest pain and Lexiscan stress showed inferior wall infarct with EF of 31% and underwent mid RCA stenting. EF historically has been low but improved with Entresto.  EF after cath was 40-45%. He also has carotid stenosis s/p LICA stent in 2014 with carotid US in 2/2018 showing occluded RICKEY with 50-69% LICA. CTA of neck 2/26/18 showed totally chronically occluded RICKEY and patent LICA stent with 50% stenosis beyond stent. He has familial hypercholesterolemia but unable to tolerate any cholesterol medication including statins, Repatha which reportedly increased glucose, Praluent was not effective and insurance denied, Questran did not help.  He came in today for follow-up.  He continues to have occasional anginal pain.  The chest pain is not severe or frequent.  He has used sublingual nitro a couple of times since his last stent.  He does have occasional dizziness especially when he bends over.  Labs are followed by Dr. Sheikh with no reports available.  Blood pressure was elevated last visit and Coreg increased.  He monitors at home with systolic pressure usually 160-180.    HPI         Cardiac History:    Past Surgical History:   Procedure Laterality Date   • CARDIAC CATHETERIZATION  06/16/2016    FFR of RCA- 0.72- 3.0x22 & 3.0x12 Resolute. FFR of LAD- 0.74- 2.5x8 & 2.5x26  Resolute   • CARDIAC CATHETERIZATION  07/16/2013    Cath-() 70%LAD. 90%RCA-3.5 x 18 & 3.5 x 15 Resolute Stents   • CARDIAC CATHETERIZATION  02/15/2017    85% RCA- 3.5x24 & 3.5x20 Promus. 50% (L) ICA.   • CARDIOTHORACIC PROCEDURE  09/20/2007    A.Cardiolite () Normal   • CARDIOVASCULAR STRESS TEST  09/08/2009    L.Myoview-() Normal   • CARDIOVASCULAR STRESS TEST  03/03/2011    L.Myoview-() Normal   • CARDIOVASCULAR STRESS TEST  08/12/2014    L.Myoview-inferior wall ischemia, Ranexa increased   • CARDIOVASCULAR STRESS TEST  03/28/2016    EF 38%, inferior infarct, tahir-infarct ischemia   • CARDIOVASCULAR STRESS TEST  07/06/2016    EF 33%, WMA inferior wall, fixed defect no ischemia   • CARDIOVASCULAR STRESS TEST  03/28/2016    L. Cardiolite @ [facility]- EF 38%. Inferior Infarct and Ischemia   • CARDIOVASCULAR STRESS TEST  02/17/2006    Cardiolite-()Normal   • CARDIOVASCULAR STRESS TEST  07/06/2016    @[facility]. L. Myoview- EF 33%. Inferior and Small Anterior Infarct.   • CARDIOVASCULAR STRESS TEST  02/08/2017    L. Myoview- EF 30%. Inferior Ischemia.   • CARDIOVASCULAR STRESS TEST  08/17/2017    Lexiscan- no definite ischemia   • CARDIOVASCULAR STRESS TEST  08/14/2018    L. Cardiolite- EF 31 %. Inferior Infarct.   • CAROTID STENT Left 11/05/2014    - 10x42 Wall Stent   • CATH LAB PROCEDURE  09/07/2018    Dr. Marcial- 3.5 x 12 Resolute stent RCA, LVEF 40-45%   • ECHO - CONVERTED  09/24/2007    Echo-([facility] EF 60%   • ECHO - CONVERTED  03/03/2011    Echo-() EF65%   • ECHO - CONVERTED  08/12/2014    Echo-EF 40-45%, RVSP 16mmHg.   • ECHO - CONVERTED  08/11/2016    EF 40%. Inferior WMA.   • ECHO - CONVERTED  02/08/2017    EF 30%. Inferior WMA.   • ECHO - CONVERTED  08/17/2017    EF 40-45%, mild MR   • ECHO - CONVERTED  08/14/2018    TLS. EF 30-35%. Inferior WMA.   • US CAROTID UNILATERAL  03/21/2011    Normnal   • US CAROTID UNILATERAL  10/13/2014    70%  (L) ICA.100% (R) ICA   • US CAROTID UNILATERAL  11/24/2015    50% (L) ICA. 100% (R) ICA   • US CAROTID UNILATERAL  02/08/2017    100% (R) ICA. < 69% (L) ICA   • US CAROTID UNILATERAL  02/13/2018    occlusion of RICKEY, LICA 50-69%, common carotid 50-69%       Current Outpatient Medications   Medication Sig Dispense Refill   • amLODIPine (NORVASC) 5 MG tablet Take 1 tablet by mouth Daily. 90 tablet 3   • aspirin 81 MG EC tablet Increase to twice a day     • carvedilol (COREG) 6.25 MG tablet Take 1 tablet by mouth 2 (Two) Times a Day. 60 tablet 11   • clopidogrel (PLAVIX) 75 MG tablet Take 1 tablet by mouth Daily. 90 tablet 3   • gabapentin (NEURONTIN) 600 MG tablet Take 600 mg by mouth 3 (Three) Times a Day.     • insulin glargine (LANTUS) 100 UNIT/ML injection Inject  under the skin daily.     • isosorbide mononitrate (IMDUR) 30 MG 24 hr tablet Increase to twice a day (Patient taking differently: Daily. Increase to twice a day) 180 tablet 3   • metFORMIN (GLUCOPHAGE) 1000 MG tablet Take 1,000 mg by mouth 2 (two) times a day with meals.     • ranitidine (ZANTAC) 150 MG tablet Take 150 mg by mouth 2 (two) times a day.     • ranolazine (RANEXA) 1000 MG 12 hr tablet Take 1 tablet by mouth Every 12 (Twelve) Hours. 180 tablet 3   • sacubitril-valsartan (ENTRESTO)  MG tablet Take 1 tablet by mouth 2 (Two) Times a Day. 180 tablet 3     No current facility-administered medications for this visit.        Morphine and related and Niacin and related    Past Medical History:   Diagnosis Date   • Bilat. inguinal hernia repair    • CAD (coronary artery disease)    • Cancer (CMS/HCC)     skin cancer removed faced   • Carotid stenosis    • Diabetes mellitus (CMS/HCC)    • Diverticulitis    • GI bleed    • History of knee replacement     Bilat.   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    • Low sodium levels        Social History  "    Socioeconomic History   • Marital status:      Spouse name: Not on file   • Number of children: Not on file   • Years of education: Not on file   • Highest education level: Not on file   Social Needs   • Financial resource strain: Not on file   • Food insecurity - worry: Not on file   • Food insecurity - inability: Not on file   • Transportation needs - medical: Not on file   • Transportation needs - non-medical: Not on file   Occupational History   • Not on file   Tobacco Use   • Smoking status: Former Smoker     Last attempt to quit:      Years since quittin.1   • Smokeless tobacco: Former User     Quit date:    Substance and Sexual Activity   • Alcohol use: No   • Drug use: No   • Sexual activity: Not on file   Other Topics Concern   • Not on file   Social History Narrative   • Not on file       Family History   Problem Relation Age of Onset   • Hyperlipidemia Mother    • Pancreatitis Father    • Diabetes Father    • Heart disease Sister    • Heart disease Brother    • Hypertension Daughter    • Hypertension Grandchild        Review of Systems   Constitution: Negative for decreased appetite, weakness and weight loss.   HENT: Negative.    Eyes: Negative.    Cardiovascular: Positive for chest pain (mild ) and dyspnea on exertion (mild ). Negative for leg swelling, palpitations and syncope.   Respiratory: Positive for shortness of breath. Negative for cough.    Endocrine: Negative.    Hematologic/Lymphatic: Negative.    Skin: Negative.    Musculoskeletal: Negative for falls and myalgias.   Gastrointestinal: Negative for abdominal pain and melena.   Genitourinary: Negative for hematuria.   Neurological: Positive for dizziness.   Psychiatric/Behavioral: Negative for altered mental status and depression.   Allergic/Immunologic: Negative.         Diabetes- Yes  Thyroid-normal    Objective     /70 (BP Location: Left arm)   Pulse 68   Ht 175.3 cm (69.02\")   Wt 99.3 kg (219 lb)   BMI 32.33 " kg/m²      Physical Exam   Constitutional: He is oriented to person, place, and time. He appears well-developed and well-nourished. No distress.   HENT:   Head: Normocephalic and atraumatic.   Eyes: Pupils are equal, round, and reactive to light.   Neck: Normal range of motion. Carotid bruit is present (bilaterally ).   Cardiovascular: Normal rate, regular rhythm and intact distal pulses.   Murmur heard.  Pulmonary/Chest: Effort normal. No respiratory distress. He has no wheezes. He has no rales.   Abdominal: Soft. Bowel sounds are normal. He exhibits no distension.   Musculoskeletal: Normal range of motion.   Neurological: He is alert and oriented to person, place, and time.   Skin: Skin is warm and dry. He is not diaphoretic.   Psychiatric: He has a normal mood and affect.   Nursing note and vitals reviewed.     Procedures          Assessment/Plan    Heart rate is stable.  Blood pressure is mild to moderately elevated at 160/70.  He does report blood pressures elevated at home as well.  Since his LV EF is low at 40-45%, he is advised to increase dose of Entresto to 97/103 twice daily.  Continue to monitor blood pressure.  If he becomes hypotensive he is advised to go back to lower dose of Entresto.  Will avoid letting the blood pressure get too low secondary to his carotid artery stenosis.  He has significant bilateral carotid bruit.  Will repeat carotid ultrasound to evaluate LICA stenosis as last CTA was measured at 50% with 100% occlusion of our ICA.  He understands to continue Plavix and aspirin for CAD as well as carotid stenosis.  Regarding the lipids, will try to get Praluent approved again as he has had another stenting.  According to him, labs checked 3 months ago. Could we get a copy for prior authorization process for Praluent?  He is encouraged to follow a low saturated fat diet, limit sodium to 1500 mg daily, and regular walking as he can tolerate.  He does not feel his chest pain warrants further  investigation at this time.  If this becomes more significant, recommend repeating Lexiscan stress.  Meanwhile continue Ranexa and isosorbide twice daily.  Further recommendations to follow carotid ultrasound.  We will see him back in 6 months or sooner if needed.      Garry was seen today for follow-up, shortness of breath, chest pain and dizziness.    Diagnoses and all orders for this visit:    Coronary artery disease of native artery of native heart with stable angina pectoris (CMS/[facility])    Bilateral carotid artery stenosis  -     Cancel: US Carotid Bilateral; Future  -     US Carotid Bilateral; Future    Congestive heart failure with LV diastolic dysfunction, NYHA class 2 (CMS/[facility])    Elevated triglycerides with high cholesterol    Essential hypertension  -     Cancel: US Carotid Bilateral; Future    Familial hypercholesterolemia  -     Cancel: US Carotid Bilateral; Future  -     US Carotid Bilateral; Future    Type 2 diabetes mellitus with complication, with long-term current use of insulin (CMS/[facility])    Other orders  -     sacubitril-valsartan (ENTRESTO)  MG tablet; Take 1 tablet by mouth 2 (Two) Times a Day.        Patient's Body mass index is 32.33 kg/m². BMI is above normal parameters. Recommendations include: nutrition counseling.                 Electronically signed by JULIO CÉSAR Hung,  March 6, 2019 4:51 PM

## 2019-03-12 ENCOUNTER — HOSPITAL ENCOUNTER (OUTPATIENT)
Dept: CARDIOLOGY | Facility: HOSPITAL | Age: 74
Discharge: HOME | End: 2019-03-12
Admitting: NURSE PRACTITIONER

## 2019-03-12 DIAGNOSIS — I65.23 BILATERAL CAROTID ARTERY STENOSIS: ICD-10-CM

## 2019-03-12 DIAGNOSIS — E78.01 FAMILIAL HYPERCHOLESTEROLEMIA: ICD-10-CM

## 2019-03-12 PROCEDURE — 93880 EXTRACRANIAL BILAT STUDY: CPT

## 2019-03-12 PROCEDURE — 93880 EXTRACRANIAL BILAT STUDY: CPT | Performed by: RADIOLOGY

## 2019-03-15 ENCOUNTER — TELEPHONE (OUTPATIENT)
Dept: CARDIOLOGY | Facility: CLINIC | Age: 74
End: 2019-03-15

## 2019-03-15 NOTE — TELEPHONE ENCOUNTER
Brigette,     Can we see if we can get Mr. Yen approved for Praluent. I sent a script to his personal pharmacy and the patient has not heard anything from them.

## 2019-03-18 NOTE — TELEPHONE ENCOUNTER
I checked patients insurance. Praluent 75 mg Q2W and 150 mg Q2W covered , Tier 5.    Which do you want?

## 2019-07-30 RX ORDER — AMLODIPINE BESYLATE 5 MG/1
TABLET ORAL
Qty: 90 TABLET | Refills: 0 | Status: SHIPPED | OUTPATIENT
Start: 2019-07-30 | End: 2019-10-04 | Stop reason: SDUPTHER

## 2019-09-04 ENCOUNTER — OFFICE VISIT (OUTPATIENT)
Dept: CARDIOLOGY | Facility: CLINIC | Age: 74
End: 2019-09-04

## 2019-09-04 VITALS
HEIGHT: 69 IN | HEART RATE: 72 BPM | WEIGHT: 212 LBS | DIASTOLIC BLOOD PRESSURE: 70 MMHG | SYSTOLIC BLOOD PRESSURE: 160 MMHG | BODY MASS INDEX: 31.4 KG/M2

## 2019-09-04 DIAGNOSIS — R06.02 SHORTNESS OF BREATH: ICD-10-CM

## 2019-09-04 DIAGNOSIS — I50.30 CONGESTIVE HEART FAILURE WITH LV DIASTOLIC DYSFUNCTION, NYHA CLASS 2 (HCC): Primary | ICD-10-CM

## 2019-09-04 DIAGNOSIS — I65.23 BILATERAL CAROTID ARTERY STENOSIS: ICD-10-CM

## 2019-09-04 DIAGNOSIS — I25.118 CORONARY ARTERY DISEASE OF NATIVE ARTERY OF NATIVE HEART WITH STABLE ANGINA PECTORIS (HCC): ICD-10-CM

## 2019-09-04 DIAGNOSIS — R42 DIZZINESS: ICD-10-CM

## 2019-09-04 DIAGNOSIS — Z79.4 TYPE 2 DIABETES MELLITUS WITH COMPLICATION, WITH LONG-TERM CURRENT USE OF INSULIN (HCC): ICD-10-CM

## 2019-09-04 DIAGNOSIS — I10 ESSENTIAL HYPERTENSION: ICD-10-CM

## 2019-09-04 DIAGNOSIS — E66.9 OBESITY (BMI 30.0-34.9): ICD-10-CM

## 2019-09-04 DIAGNOSIS — E78.2 MIXED HYPERLIPIDEMIA: ICD-10-CM

## 2019-09-04 DIAGNOSIS — E11.8 TYPE 2 DIABETES MELLITUS WITH COMPLICATION, WITH LONG-TERM CURRENT USE OF INSULIN (HCC): ICD-10-CM

## 2019-09-04 PROCEDURE — 99214 OFFICE O/P EST MOD 30 MIN: CPT | Performed by: NURSE PRACTITIONER

## 2019-09-04 RX ORDER — ISOSORBIDE MONONITRATE 30 MG/1
30 TABLET, EXTENDED RELEASE ORAL
Qty: 30 TABLET | Refills: 8 | Status: SHIPPED | OUTPATIENT
Start: 2019-09-04 | End: 2019-10-04

## 2019-09-04 RX ORDER — ISOSORBIDE MONONITRATE 60 MG/1
60 TABLET, EXTENDED RELEASE ORAL EVERY MORNING
Qty: 30 TABLET | Refills: 11 | Status: SHIPPED | OUTPATIENT
Start: 2019-09-04 | End: 2019-10-03 | Stop reason: ALTCHOICE

## 2019-09-16 ENCOUNTER — APPOINTMENT (OUTPATIENT)
Dept: CARDIOLOGY | Facility: HOSPITAL | Age: 74
End: 2019-09-16

## 2019-09-18 ENCOUNTER — OUTSIDE FACILITY SERVICE (OUTPATIENT)
Dept: CARDIOLOGY | Facility: CLINIC | Age: 74
End: 2019-09-18

## 2019-09-18 PROCEDURE — 93308 TTE F-UP OR LMTD: CPT | Performed by: INTERNAL MEDICINE

## 2019-09-18 PROCEDURE — 99215 OFFICE O/P EST HI 40 MIN: CPT | Performed by: INTERNAL MEDICINE

## 2019-09-18 PROCEDURE — 93321 DOPPLER ECHO F-UP/LMTD STD: CPT | Performed by: INTERNAL MEDICINE

## 2019-09-18 PROCEDURE — 93325 DOPPLER ECHO COLOR FLOW MAPG: CPT | Performed by: INTERNAL MEDICINE

## 2019-09-18 PROCEDURE — 78452 HT MUSCLE IMAGE SPECT MULT: CPT | Performed by: INTERNAL MEDICINE

## 2019-09-18 PROCEDURE — 93018 CV STRESS TEST I&R ONLY: CPT | Performed by: INTERNAL MEDICINE

## 2019-10-03 ENCOUNTER — OFFICE VISIT (OUTPATIENT)
Dept: CARDIOLOGY | Facility: CLINIC | Age: 74
End: 2019-10-03

## 2019-10-03 VITALS
HEIGHT: 69 IN | DIASTOLIC BLOOD PRESSURE: 58 MMHG | HEART RATE: 72 BPM | WEIGHT: 211.2 LBS | BODY MASS INDEX: 31.28 KG/M2 | SYSTOLIC BLOOD PRESSURE: 120 MMHG

## 2019-10-03 DIAGNOSIS — Z79.4 TYPE 2 DIABETES MELLITUS WITH OTHER CIRCULATORY COMPLICATION, WITH LONG-TERM CURRENT USE OF INSULIN (HCC): ICD-10-CM

## 2019-10-03 DIAGNOSIS — E78.2 ELEVATED TRIGLYCERIDES WITH HIGH CHOLESTEROL: ICD-10-CM

## 2019-10-03 DIAGNOSIS — I65.23 BILATERAL CAROTID ARTERY STENOSIS: ICD-10-CM

## 2019-10-03 DIAGNOSIS — I10 ESSENTIAL HYPERTENSION: ICD-10-CM

## 2019-10-03 DIAGNOSIS — I20.8 STABLE ANGINA PECTORIS (HCC): ICD-10-CM

## 2019-10-03 DIAGNOSIS — I25.118 CORONARY ARTERY DISEASE OF NATIVE ARTERY OF NATIVE HEART WITH STABLE ANGINA PECTORIS (HCC): Primary | ICD-10-CM

## 2019-10-03 DIAGNOSIS — I50.30 CONGESTIVE HEART FAILURE WITH LV DIASTOLIC DYSFUNCTION, NYHA CLASS 2 (HCC): ICD-10-CM

## 2019-10-03 DIAGNOSIS — E11.59 TYPE 2 DIABETES MELLITUS WITH OTHER CIRCULATORY COMPLICATION, WITH LONG-TERM CURRENT USE OF INSULIN (HCC): ICD-10-CM

## 2019-10-03 DIAGNOSIS — I73.9 PVD (PERIPHERAL VASCULAR DISEASE) (HCC): ICD-10-CM

## 2019-10-03 PROCEDURE — 99214 OFFICE O/P EST MOD 30 MIN: CPT | Performed by: NURSE PRACTITIONER

## 2019-10-03 RX ORDER — NITROGLYCERIN 0.4 MG/1
0.4 TABLET SUBLINGUAL
COMMUNITY

## 2019-10-03 RX ORDER — ICOSAPENT ETHYL 1000 MG/1
2 CAPSULE ORAL 2 TIMES DAILY WITH MEALS
Qty: 120 CAPSULE | Refills: 8 | Status: SHIPPED | OUTPATIENT
Start: 2019-10-03 | End: 2019-11-11 | Stop reason: RX

## 2019-10-03 NOTE — PROGRESS NOTES
"Chief Complaint   Patient presents with   • Hospital follow up     Patient was recently in hospital for chest pain, had echo and stress, see chart. He went back to [facility] ER on Saturday due to becoming dizzy, blurred vision, \"blacked out\" and vomiting while with son. He was told it was because he got to hot and low blood.    • Lab     Has copy of CBC today per PCP.    • Chest Pain     Has had couple more episodes of sharp left chest pain that radiates to left arm, he has been using Nitro sl with relief.   • Dizziness     Had another episode of dizziness yesterday that he had to sit down several times.   • Irregular Heart Beat     He states \"it feels like my heart is jumping at times\".    • Blood pressure     Has list of B/P checks since increase in Entresto, B/P 112/50 to 187/85.   • Med Refill     Needs refills on Amlodipine, Coreg-90 day.       Subjective       Garry Yen is a 74 y.o. male with HTN, familial hypercholesterolemia, diabetes, and IHD who underwent stenting in 2103, 2016, 2017, and again on 9/7/2018. After stenting and Entresto, the EF improved to 45%. He also has carotid stenosis s/p LICA stent in 2014. CTA of neck 2/26/18 showed totally chronically occluded RICEKY and patent LICA stent with 50% stenosis beyond stent. US repeated in 2019 remained unchanged. He has familial hypercholesterolemia unable to tolerate any cholesterol medication including statins, Repatha which reportedly increased glucose, Praluent was not effective and insurance denied, Questran did not help. He reported more CP at recent follow up, stress and echo were ordered.  Before the stress test could be done, he developed sudden onset socrates rectal bleeding.  He was admitted to [facility] and kept overnight.  The bleeding stopped and he was sent home. Later that day he was taking a shower when he developed severe chest tightness. He took a couple of sl nitro and went back to [facility] then transferred to [facility].  Adenosine stress showed similar " findings as previous of inferior infarct with tahir-infarct ischemia. Medical management continued. Lipids elevated with , LDL 75, HDL 23, triglycerides 2265.  LFT normal.  H&H 9.3/26, platelets 200, amylase and lipase not available. He came today for follow up. Continues to have chest pain radiating to left arm and neck. Relieved with sl nitro. He had a syncopal episode on Saturday, 9/28/19, [place], suddenly felt dizzy with blurred vision, passed out and had vomiting. His son called 911 and he was treated at [facility] with heat exhaustion. D-dimer elevated, no PE noted on CTA chest. HGB improved from 9.4 to 10.1. BUN/Cr 23/1.4.     HPI         Cardiac History:    Past Surgical History:   Procedure Laterality Date   • CARDIAC CATHETERIZATION  06/16/2016    FFR of RCA- 0.72- 3.0x22 & 3.0x12 Resolute. FFR of LAD- 0.74- 2.5x8 & 2.5x26 Resolute   • CARDIAC CATHETERIZATION  07/16/2013    Cath-() 70%LAD. 90%RCA-3.5 x 18 & 3.5 x 15 Resolute Stents   • CARDIAC CATHETERIZATION  02/15/2017    85% RCA- 3.5x24 & 3.5x20 Promus. 50% (L) ICA.   • CARDIAC CATHETERIZATION  09/07/2018    Dr. Marcial- 3.5 x 12 Resolute stent RCA, LVEF 40-45%   • CARDIOTHORACIC PROCEDURE  09/20/2007    AAlexisCardiosarinate () Normal   • CARDIOVASCULAR STRESS TEST  09/08/2009    Ana-() Normal   • CARDIOVASCULAR STRESS TEST  03/03/2011    Ana-() Normal   • CARDIOVASCULAR STRESS TEST  08/12/2014    L.Myoview-inferior wall ischemia, Ranexa increased   • CARDIOVASCULAR STRESS TEST  03/28/2016    EF 38%, inferior infarct, tahir-infarct ischemia   • CARDIOVASCULAR STRESS TEST  07/06/2016    EF 33%, WMA inferior wall, fixed defect no ischemia   • CARDIOVASCULAR STRESS TEST  03/28/2016    HIMANSHU Hernandez @ [facility]- EF 38%. Inferior Infarct and Ischemia   • CARDIOVASCULAR STRESS TEST  02/17/2006    Cardiolite-()Normal   • CARDIOVASCULAR STRESS TEST  07/06/2016    @[facility]. . Myoview-  EF 33%. Inferior and Small Anterior Infarct.   • CARDIOVASCULAR STRESS TEST  02/08/2017    L. Myoview- EF 30%. Inferior Ischemia.   • CARDIOVASCULAR STRESS TEST  08/17/2017    Lexiscan- no definite ischemia   • CARDIOVASCULAR STRESS TEST  08/14/2018    L. Cardiolite- EF 31 %. Inferior Infarct.   • CARDIOVASCULAR STRESS TEST  09/18/2019    @[facility]. L. Cardiolite- EF 41%. Inferolateral Infarct with mild Periinfarct Ischemia.   • CAROTID STENT Left 11/05/2014    - 10x42 Wall Stent   • ECHO - CONVERTED  09/24/2007    Echo-([facility].) EF 60%   • ECHO - CONVERTED  03/03/2011    Echo-() EF65%   • ECHO - CONVERTED  08/12/2014    Echo-EF 40-45%, RVSP 16mmHg.   • ECHO - CONVERTED  08/11/2016    EF 40%. Inferior WMA.   • ECHO - CONVERTED  02/08/2017    EF 30%. Inferior WMA.   • ECHO - CONVERTED  08/17/2017    EF 40-45%, mild MR   • ECHO - CONVERTED  08/14/2018    TLS. EF 30-35%. Inferior WMA.   • ECHO - CONVERTED  09/18/2019    @ [facility]. TLS. EF 45%. Inferior WMA   • US CAROTID UNILATERAL  03/21/2011    Normnal   • US CAROTID UNILATERAL  10/13/2014    70% (L) ICA.100% (R) ICA   • US CAROTID UNILATERAL  11/24/2015    50% (L) ICA. 100% (R) ICA   • US CAROTID UNILATERAL  02/08/2017    100% (R) ICA. < 69% (L) ICA   • US CAROTID UNILATERAL  02/13/2018    occlusion of RICKEY, LICA 50-69%, common carotid 50-69%   • US CAROTID UNILATERAL  03/12/2019    DUSTY:  RICKEY 100% occlusion, LICA stent patent       Current Outpatient Medications   Medication Sig Dispense Refill   • amLODIPine (NORVASC) 5 MG tablet Take 1 tablet by mouth Daily. 90 tablet 3   • aspirin 81 MG EC tablet Take 81 mg by mouth Daily. Increase to twice a day     • carvedilol (COREG) 6.25 MG tablet Take 1 tablet by mouth 2 (Two) Times a Day. 180 tablet 3   • clopidogrel (PLAVIX) 75 MG tablet Take 1 tablet by mouth Daily. 90 tablet 3   • gabapentin (NEURONTIN) 600 MG tablet Take 600 mg by mouth 4 (Four) Times a Day.     • insulin glargine (LANTUS) 100 UNIT/ML  injection Inject  under the skin daily.     • isosorbide mononitrate (IMDUR) 30 MG 24 hr tablet Take 1 tablet by mouth every night at bedtime. 30 tablet 8   • metFORMIN (GLUCOPHAGE) 1000 MG tablet Take 1,000 mg by mouth 2 (two) times a day with meals.     • nitroglycerin (NITROSTAT) 0.4 MG SL tablet Place 0.4 mg under the tongue Every 5 (Five) Minutes As Needed for Chest Pain. Take no more than 3 doses in 15 minutes.     • ranitidine (ZANTAC) 150 MG tablet Take 150 mg by mouth 2 (two) times a day.     • ranolazine (RANEXA) 1000 MG 12 hr tablet Take 1 tablet by mouth Every 12 (Twelve) Hours. 180 tablet 3   • sacubitril-valsartan (ENTRESTO)  MG tablet Take 1 tablet by mouth 2 (Two) Times a Day. 180 tablet 3   • icosapent ethyl (VASCEPA) 1 g capsule capsule Take 2 g by mouth 2 (Two) Times a Day With Meals. 120 capsule 8   • isosorbide mononitrate (IMDUR) 60 MG 24 hr tablet Take 1 tablet by mouth Every Morning. 30 tablet 11     Current Facility-Administered Medications   Medication Dose Route Frequency Provider Last Rate Last Dose   • Alirocumab solution pen-injector 1 mL  1 mL Subcutaneous Q14 Days Mali Bhardwaj APRN           Morphine and related and Niacin and related    Past Medical History:   Diagnosis Date   • Bilat. inguinal hernia repair    • CAD (coronary artery disease)    • Cancer (CMS/HCC)     skin cancer removed faced   • Carotid stenosis    • Diabetes mellitus (CMS/HCC)    • Diverticulitis    • GI bleed    • History of knee replacement     Bilat.   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    • Low sodium levels       Social History     Socioeconomic History   • Marital status:      Spouse name: Not on file   • Number of children: Not on file   • Years of education: Not on file   • Highest education level: Not on file   Tobacco Use   • Smoking status: Former Smoker     Last attempt to quit: 1987  "    Years since quittin.7   • Smokeless tobacco: Former User     Quit date:    Substance and Sexual Activity   • Alcohol use: No   • Drug use: No       Family History   Problem Relation Age of Onset   • Hyperlipidemia Mother    • Pancreatitis Father    • Diabetes Father    • Heart disease Sister    • Heart disease Brother    • Hypertension Daughter    • Hypertension Grandchild        Review of Systems   Constitution: Negative for decreased appetite, weakness, malaise/fatigue and weight gain.   HENT: Negative.    Eyes: Negative.    Cardiovascular: Positive for chest pain and dyspnea on exertion. Negative for leg swelling.   Respiratory: Negative for cough and shortness of breath.    Endocrine: Negative.    Hematologic/Lymphatic: Negative.    Skin: Negative.    Musculoskeletal: Negative for falls and myalgias.   Gastrointestinal: Negative for abdominal pain.   Genitourinary: Negative for hematuria.   Neurological: Negative for dizziness.   Psychiatric/Behavioral: Negative for altered mental status and depression.   Allergic/Immunologic: Negative.       Diabetes- Yes  Thyroid-normal    Objective     /58 (BP Location: Left arm)   Pulse 72   Ht 175.3 cm (69.02\")   Wt 95.8 kg (211 lb 3.2 oz)   BMI 31.17 kg/m²     Physical Exam   Constitutional: He is oriented to person, place, and time. He appears well-developed and well-nourished. No distress.   HENT:   Head: Normocephalic and atraumatic.   Eyes: Pupils are equal, round, and reactive to light.   Neck: Normal range of motion.   Cardiovascular: Normal rate and regular rhythm.   Pulmonary/Chest: Effort normal and breath sounds normal. No respiratory distress.   Abdominal: Soft. Bowel sounds are normal.   Musculoskeletal: Normal range of motion. He exhibits no edema.   Neurological: He is alert and oriented to person, place, and time.   Skin: Skin is warm and dry. He is not diaphoretic.   Psychiatric: He has a normal mood and affect.   Nursing note and " vitals reviewed.     Procedures          Assessment/Plan      Garry was seen today for hospital follow up, lab, chest pain, dizziness, irregular heart beat, blood pressure and med refill.    Diagnoses and all orders for this visit:    Coronary artery disease of native artery of native heart with stable angina pectoris (CMS/[facility])  -     [facility]; Future  -     isosorbide mononitrate (IMDUR) 60 MG 24 hr tablet; Take 1 tablet by mouth Every Morning.  -     isosorbide mononitrate (IMDUR) 30 MG 24 hr tablet; Take 1 tablet by mouth every night at bedtime.  -     carvedilol (COREG) 6.25 MG tablet; Take 1 tablet by mouth 2 (Two) Times a Day.    Bilateral carotid artery stenosis    Congestive heart failure with LV diastolic dysfunction, NYHA class 2 (CMS/[facility])  -     carvedilol (COREG) 6.25 MG tablet; Take 1 tablet by mouth 2 (Two) Times a Day.    Essential hypertension  -     [facility]; Future  -     carvedilol (COREG) 6.25 MG tablet; Take 1 tablet by mouth 2 (Two) Times a Day.  -     amLODIPine (NORVASC) 5 MG tablet; Take 1 tablet by mouth Daily.    Elevated triglycerides with high cholesterol  -     icosapent ethyl (VASCEPA) 1 g capsule capsule; Take 2 g by mouth 2 (Two) Times a Day With Meals.  -     [facility]; Future    PVD (peripheral vascular disease) (CMS/[facility])  -     [facility]; Future    Type 2 diabetes mellitus with other circulatory complication, with long-term [place] use of [place]  -     [facility]; Future    Stable angina pectoris (CMS/[facility])  -     [facility]; Future  -     isosorbide mononitrate (IMDUR) 60 MG 24 hr tablet; Take 1 tablet by mouth Every Morning.  -     isosorbide mononitrate (IMDUR) 30 MG 24 hr tablet; Take 1 tablet by mouth every night at bedtime.    1. CAD- multivessel CAD with stenting, most recent 9/2018. Recent adenosine stress with inferior wall infarct, tahir-infarct ischemia. We have maximized medical management with  Imdur 60 mg in AM and 30 mg in PM, Ranexa 1000 mg BID, sl nitro PRN, BB, CCB, DAPT. He continues to have discomfort and requests cardiac cath for further evaluation. I agree and will be scheduled. He is advised to present to ER for chest pain that does not respond to nitro.     2. Bilateral carotid stenosis- chronic occlusion of RICKEY, patent stent to LICA, 3/2019. Plavix, aspirin. Statin intolerant.     3. LV dysfunction- appears euvolemic, EF 45%, continue Entresto, Coreg. Limit sodium 1500 mg     4. HTN- stable on current regimen.     5. Hypercholesterolemia- not well controlled secondary to his intolerance to statin, Zetia, Welchol, PCSK9. Will add Vascepa 2 GM BID today for significant hypertriglyceridemia. Encouraged to follow low fat, low sugar diet.     We reviewed his recent stress test and echocardiogram showing inferior infarct with tahir-infarct ischemia. He continues to have chest pain and recent syncope despite maximizing medical management. He is exteremly high risk for restenosis with uncontrolled lipids and diabetes. Patient as well as Dr. Arredondo have requested cardiac catheterization which will be scheduled. Further recommendations to follow.       Patient's Body mass index is 31.17 kg/m². BMI is above normal parameters. Recommendations include: nutrition counseling.              Electronically signed by JULIO CÉSAR Hung,  October 4, 2019 10:32 AM

## 2019-10-04 RX ORDER — ISOSORBIDE MONONITRATE 60 MG/1
60 TABLET, EXTENDED RELEASE ORAL EVERY MORNING
Qty: 30 TABLET | Refills: 11
Start: 2019-10-04 | End: 2019-10-21 | Stop reason: SDUPTHER

## 2019-10-04 RX ORDER — ISOSORBIDE MONONITRATE 30 MG/1
30 TABLET, EXTENDED RELEASE ORAL
Qty: 30 TABLET | Refills: 8
Start: 2019-10-04 | End: 2019-10-21 | Stop reason: SDUPTHER

## 2019-10-04 RX ORDER — AMLODIPINE BESYLATE 5 MG/1
5 TABLET ORAL DAILY
Qty: 90 TABLET | Refills: 3 | Status: SHIPPED | OUTPATIENT
Start: 2019-10-04 | End: 2019-11-11 | Stop reason: SDUPTHER

## 2019-10-04 RX ORDER — CARVEDILOL 6.25 MG/1
6.25 TABLET ORAL 2 TIMES DAILY
Qty: 180 TABLET | Refills: 3 | Status: SHIPPED | OUTPATIENT
Start: 2019-10-04 | End: 2019-11-11 | Stop reason: SDUPTHER

## 2019-10-14 ENCOUNTER — PREP FOR SURGERY (OUTPATIENT)
Dept: CARDIOLOGY | Facility: CLINIC | Age: 74
End: 2019-10-14

## 2019-10-14 NOTE — H&P
Garry Bartholomewley  1945        Current Outpatient Medications:   •  amLODIPine (NORVASC) 5 MG tablet, Take 1 tablet by mouth Daily., Disp: 90 tablet, Rfl: 3  •  aspirin 81 MG EC tablet, Take 81 mg by mouth Daily. Increase to twice a day, Disp: , Rfl:   •  carvedilol (COREG) 6.25 MG tablet, Take 1 tablet by mouth 2 (Two) Times a Day., Disp: 180 tablet, Rfl: 3  •  clopidogrel (PLAVIX) 75 MG tablet, Take 1 tablet by mouth Daily., Disp: 90 tablet, Rfl: 3  •  gabapentin (NEURONTIN) 600 MG tablet, Take 600 mg by mouth 4 (Four) Times a Day., Disp: , Rfl:   •  icosapent ethyl (VASCEPA) 1 g capsule capsule, Take 2 g by mouth 2 (Two) Times a Day With Meals., Disp: 120 capsule, Rfl: 8  •  insulin glargine (LANTUS) 100 UNIT/ML injection, Inject  under the skin daily., Disp: , Rfl:   •  isosorbide mononitrate (IMDUR) 30 MG 24 hr tablet, Take 1 tablet by mouth every night at bedtime., Disp: 30 tablet, Rfl: 8  •  isosorbide mononitrate (IMDUR) 60 MG 24 hr tablet, Take 1 tablet by mouth Every Morning., Disp: 30 tablet, Rfl: 11  •  metFORMIN (GLUCOPHAGE) 1000 MG tablet, Take 1,000 mg by mouth 2 (two) times a day with meals., Disp: , Rfl:   •  nitroglycerin (NITROSTAT) 0.4 MG SL tablet, Place 0.4 mg under the tongue Every 5 (Five) Minutes As Needed for Chest Pain. Take no more than 3 doses in 15 minutes., Disp: , Rfl:   •  ranitidine (ZANTAC) 150 MG tablet, Take 150 mg by mouth 2 (two) times a day., Disp: , Rfl:   •  ranolazine (RANEXA) 1000 MG 12 hr tablet, Take 1 tablet by mouth Every 12 (Twelve) Hours., Disp: 180 tablet, Rfl: 3  •  sacubitril-valsartan (ENTRESTO)  MG tablet, Take 1 tablet by mouth 2 (Two) Times a Day., Disp: 180 tablet, Rfl: 3    Current Facility-Administered Medications:   •  Alirocumab solution pen-injector 1 mL, 1 mL, Subcutaneous, Q14 Days, Mali Bhardwaj, APRN

## 2019-10-15 ENCOUNTER — OUTSIDE FACILITY SERVICE (OUTPATIENT)
Dept: CARDIOLOGY | Facility: CLINIC | Age: 74
End: 2019-10-15

## 2019-10-15 PROCEDURE — 93458 L HRT ARTERY/VENTRICLE ANGIO: CPT | Performed by: INTERNAL MEDICINE

## 2019-10-21 ENCOUNTER — TELEPHONE (OUTPATIENT)
Dept: CARDIOLOGY | Facility: CLINIC | Age: 74
End: 2019-10-21

## 2019-10-21 DIAGNOSIS — I20.8 STABLE ANGINA PECTORIS (HCC): ICD-10-CM

## 2019-10-21 DIAGNOSIS — I25.118 CORONARY ARTERY DISEASE OF NATIVE ARTERY OF NATIVE HEART WITH STABLE ANGINA PECTORIS (HCC): ICD-10-CM

## 2019-10-21 RX ORDER — ISOSORBIDE MONONITRATE 30 MG/1
30 TABLET, EXTENDED RELEASE ORAL
Qty: 30 TABLET | Refills: 5 | Status: SHIPPED | OUTPATIENT
Start: 2019-10-21 | End: 2019-11-11

## 2019-10-21 RX ORDER — ISOSORBIDE MONONITRATE 60 MG/1
60 TABLET, EXTENDED RELEASE ORAL EVERY MORNING
Qty: 30 TABLET | Refills: 5 | Status: SHIPPED | OUTPATIENT
Start: 2019-10-21 | End: 2019-11-11

## 2019-10-21 NOTE — TELEPHONE ENCOUNTER
Patient called requesting refills on Imdur 60mg in am and 30mg at night, he reports pharmacy did not receive refills. Refills on Imdur 60mg in am and Imdur 30mg at HS sent to pharmacy.

## 2019-11-01 RX ORDER — AMLODIPINE BESYLATE 5 MG/1
TABLET ORAL
Qty: 90 TABLET | Refills: 0 | OUTPATIENT
Start: 2019-11-01

## 2019-11-11 ENCOUNTER — OFFICE VISIT (OUTPATIENT)
Dept: CARDIOLOGY | Facility: CLINIC | Age: 74
End: 2019-11-11

## 2019-11-11 VITALS
BODY MASS INDEX: 32.14 KG/M2 | HEIGHT: 69 IN | HEART RATE: 68 BPM | WEIGHT: 217 LBS | DIASTOLIC BLOOD PRESSURE: 62 MMHG | SYSTOLIC BLOOD PRESSURE: 140 MMHG

## 2019-11-11 DIAGNOSIS — E78.00 HYPERCHOLESTEREMIA: ICD-10-CM

## 2019-11-11 DIAGNOSIS — Z79.4 TYPE 2 DIABETES MELLITUS WITHOUT COMPLICATION, WITH LONG-TERM CURRENT USE OF INSULIN (HCC): ICD-10-CM

## 2019-11-11 DIAGNOSIS — E78.2 ELEVATED TRIGLYCERIDES WITH HIGH CHOLESTEROL: ICD-10-CM

## 2019-11-11 DIAGNOSIS — I50.30 CONGESTIVE HEART FAILURE WITH LV DIASTOLIC DYSFUNCTION, NYHA CLASS 2 (HCC): ICD-10-CM

## 2019-11-11 DIAGNOSIS — I20.8 STABLE ANGINA PECTORIS (HCC): ICD-10-CM

## 2019-11-11 DIAGNOSIS — I25.9 IHD (ISCHEMIC HEART DISEASE): Primary | ICD-10-CM

## 2019-11-11 DIAGNOSIS — E11.9 TYPE 2 DIABETES MELLITUS WITHOUT COMPLICATION, WITH LONG-TERM CURRENT USE OF INSULIN (HCC): ICD-10-CM

## 2019-11-11 DIAGNOSIS — I10 ESSENTIAL HYPERTENSION: ICD-10-CM

## 2019-11-11 PROCEDURE — 99213 OFFICE O/P EST LOW 20 MIN: CPT | Performed by: INTERNAL MEDICINE

## 2019-11-11 RX ORDER — DOXYCYCLINE HYCLATE 50 MG/1
324 CAPSULE, GELATIN COATED ORAL 2 TIMES DAILY
COMMUNITY
End: 2020-03-11 | Stop reason: ALTCHOICE

## 2019-11-11 RX ORDER — RANOLAZINE 1000 MG/1
1000 TABLET, EXTENDED RELEASE ORAL EVERY 12 HOURS SCHEDULED
Qty: 180 TABLET | Refills: 3 | Status: SHIPPED | OUTPATIENT
Start: 2019-11-11

## 2019-11-11 RX ORDER — ICOSAPENT ETHYL 1000 MG/1
2 CAPSULE ORAL 2 TIMES DAILY WITH MEALS
Qty: 360 CAPSULE | Refills: 3 | Status: SHIPPED | OUTPATIENT
Start: 2019-11-11

## 2019-11-11 RX ORDER — AMLODIPINE BESYLATE 5 MG/1
5 TABLET ORAL DAILY
Qty: 90 TABLET | Refills: 3 | Status: SHIPPED | OUTPATIENT
Start: 2019-11-11 | End: 2020-03-11

## 2019-11-11 RX ORDER — CLOPIDOGREL BISULFATE 75 MG/1
75 TABLET ORAL DAILY
Qty: 90 TABLET | Refills: 3 | Status: SHIPPED | OUTPATIENT
Start: 2019-11-11

## 2019-11-11 RX ORDER — CARVEDILOL 6.25 MG/1
6.25 TABLET ORAL 2 TIMES DAILY
Qty: 180 TABLET | Refills: 3 | Status: SHIPPED | OUTPATIENT
Start: 2019-11-11

## 2019-11-11 RX ORDER — ISOSORBIDE MONONITRATE 60 MG/1
60 TABLET, EXTENDED RELEASE ORAL EVERY MORNING
Qty: 30 TABLET | Refills: 5 | Status: SHIPPED | OUTPATIENT
Start: 2019-11-11

## 2019-11-11 NOTE — PROGRESS NOTES
Chief Complaint   Patient presents with   • Hospital follow up     recent cath with stenting, doing well   • Med Refill     Refills needed on cardiac meds to [place] [facility]. 90 days   • Labs     PCP checked last week.    • Medication Problem     Mali had sent in Vascepa at last visit, pharmacy told him they didn't have it.    • Med Dose Change     pt taking 49/51 mg Entresto BID instead of 97/103 mg BID which was listed in chart.        CARDIAC COMPLAINTS  dyspnea and fatigue        Subjective   Garry Yen is a 74 y.o. male came in today for his follow-up visit.  He has history of ischemic heart disease diagnosed in 2013 when he underwent stenting of the right coronary artery.  After which he has undergone multiple cath and stent placement.  He also developed congestive heart failure class III.  He was in the hospital in September with chest pain and shortness of breath.  His stress test did show some evidence of ischemia.  His cardiac catheterization did reveal disease involving the RCA and underwent stenting again.  His lab work which was done at that time showed his A1c was poorly controlled at 7.9.  His total cholesterol was 289 with the triglyceride of thousand 471 HDL of 28 and LDL of 71.  He is not able to tolerate any statin.  He was started on PCSK9 inhibitor.  He was also advised to be on Vascepa for the elevated triglyceride.  He apparently did not get the prescription.  He came today with no new symptoms.  He has not had any more chest pain.  He does have some mild shortness of breath and he does feel little tired.  He also has history of anemia with last hemoglobin was 9.7.  Apparently it was checked recently but the results are not available.              Cardiac History  Past Surgical History:   Procedure Laterality Date   • CARDIAC CATHETERIZATION  06/16/2016    FFR of RCA- 0.72- 3.0x22 & 3.0x12 Resolute. FFR of LAD- 0.74- 2.5x8 & 2.5x26 Resolute   • CARDIAC CATHETERIZATION  07/16/2013     Cath-() 70%LAD. 90%RCA-3.5 x 18 & 3.5 x 15 Resolute Stents   • CARDIAC CATHETERIZATION  02/15/2017    85% RCA- 3.5x24 & 3.5x20 Promus. 50% (L) ICA.   • CARDIAC CATHETERIZATION  09/07/2018    Dr. Marcial- 3.5 x 12 Resolute stent RCA, LVEF 40-45%   • CARDIAC CATHETERIZATION  10/15/2019    85% RCA- 3.5x38 & 3.0x22 Resolute. EF 40-45%   • CARDIOTHORACIC PROCEDURE  09/20/2007    A.Cardiolite () Normal   • CARDIOVASCULAR STRESS TEST  09/08/2009    L.Myoview-() Normal   • CARDIOVASCULAR STRESS TEST  03/03/2011    L.Myoview-() Normal   • CARDIOVASCULAR STRESS TEST  08/12/2014    L.Myoview-inferior wall ischemia, Ranexa increased   • CARDIOVASCULAR STRESS TEST  03/28/2016    EF 38%, inferior infarct, tahir-infarct ischemia   • CARDIOVASCULAR STRESS TEST  07/06/2016    EF 33%, WMA inferior wall, fixed defect no ischemia   • CARDIOVASCULAR STRESS TEST  03/28/2016    L. Cardiolite @ [facility]- EF 38%. Inferior Infarct and Ischemia   • CARDIOVASCULAR STRESS TEST  02/17/2006    Cardiolite-()Normal   • CARDIOVASCULAR STRESS TEST  07/06/2016    @[facility]. L. Myoview- EF 33%. Inferior and Small Anterior Infarct.   • CARDIOVASCULAR STRESS TEST  02/08/2017    L. Myoview- EF 30%. Inferior Ischemia.   • CARDIOVASCULAR STRESS TEST  08/17/2017    Lexiscan- no definite ischemia   • CARDIOVASCULAR STRESS TEST  08/14/2018    L. Cardiolite- EF 31 %. Inferior Infarct.   • CARDIOVASCULAR STRESS TEST  09/18/2019    @[facility]. L. Cardiolite- EF 41%. Inferolateral Infarct with mild Periinfarct Ischemia.   • CAROTID STENT Left 11/05/2014    - 10x42 Wall Stent   • ECHO - CONVERTED  09/24/2007    Echo-([facility] EF 60%   • ECHO - CONVERTED  03/03/2011    Echo-() EF65%   • ECHO - CONVERTED  08/12/2014    Echo-EF 40-45%, RVSP 16mmHg.   • ECHO - CONVERTED  08/11/2016    EF 40%. Inferior WMA.   • ECHO - CONVERTED  02/08/2017    EF 30%. Inferior WMA.   • ECHO - CONVERTED  08/17/2017    EF  40-45%, mild MR   • ECHO - CONVERTED  08/14/2018    TLS. EF 30-35%. Inferior WMA.   • ECHO - CONVERTED  09/18/2019    @ [facility]. TLS. EF 45%. Inferior WMA   • US CAROTID UNILATERAL  03/21/2011    Normnal   • US CAROTID UNILATERAL  10/13/2014    70% (L) ICA.100% (R) ICA   • US CAROTID UNILATERAL  11/24/2015    50% (L) ICA. 100% (R) ICA   • US CAROTID UNILATERAL  02/08/2017    100% (R) ICA. < 69% (L) ICA   • US CAROTID UNILATERAL  02/13/2018    occlusion of RICKEY, LICA 50-69%, common carotid 50-69%   • US CAROTID UNILATERAL  03/12/2019    DUSTY:  RICKEY 100% occlusion, LICA stent patent       Current Outpatient Medications   Medication Sig Dispense Refill   • amLODIPine (NORVASC) 5 MG tablet Take 1 tablet by mouth Daily. 90 tablet 3   • aspirin 81 MG EC tablet Take 81 mg by mouth Daily. Increase to twice a day     • carvedilol (COREG) 6.25 MG tablet Take 1 tablet by mouth 2 (Two) Times a Day. 180 tablet 3   • clopidogrel (PLAVIX) 75 MG tablet Take 1 tablet by mouth Daily. 90 tablet 3   • ferrous gluconate (FERGON) 324 MG tablet Take 324 mg by mouth 2 (Two) Times a Day.     • gabapentin (NEURONTIN) 600 MG tablet Take 600 mg by mouth 4 (Four) Times a Day.     • insulin glargine (LANTUS) 100 UNIT/ML injection Inject  under the skin daily.     • isosorbide mononitrate (IMDUR) 60 MG 24 hr tablet Take 1 tablet by mouth Every Morning. 30 tablet 5   • metFORMIN (GLUCOPHAGE) 1000 MG tablet Take 1,000 mg by mouth 2 (two) times a day with meals.     • nitroglycerin (NITROSTAT) 0.4 MG SL tablet Place 0.4 mg under the tongue Every 5 (Five) Minutes As Needed for Chest Pain. Take no more than 3 doses in 15 minutes.     • ranitidine (ZANTAC) 150 MG tablet Take 150 mg by mouth 2 (two) times a day.     • ranolazine (RANEXA) 1000 MG 12 hr tablet Take 1 tablet by mouth Every 12 (Twelve) Hours. 180 tablet 3   • Semaglutide (OZEMPIC, 0.25 OR 0.5 MG/DOSE, SC) Inject  under the skin into the appropriate area as directed.     • icosapent ethyl  (VASCEPA) 1 g capsule capsule Take 2 g by mouth 2 (Two) Times a Day With Meals. 360 capsule 3   • sacubitril-valsartan (ENTRESTO)  MG tablet Take 1 tablet by mouth 2 (Two) Times a Day. 180 tablet 3     Current Facility-Administered Medications   Medication Dose Route Frequency Provider Last Rate Last Dose   • Alirocumab solution pen-injector 1 mL  1 mL Subcutaneous Q14 Days Mali Bhardwaj APRN           Allergies  :  Morphine and related and Niacin and related       Past Medical History:   Diagnosis Date   • Bilat. inguinal hernia repair    • CAD (coronary artery disease)    • Cancer (CMS/HCC)     skin cancer removed faced   • Carotid stenosis    • Diabetes mellitus (CMS/HCC)    • Diverticulitis    • GI bleed    • History of knee replacement     Bilat.   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    • Low sodium levels        Social History     Socioeconomic History   • Marital status:      Spouse name: Not on file   • Number of children: Not on file   • Years of education: Not on file   • Highest education level: Not on file   Tobacco Use   • Smoking status: Former Smoker     Last attempt to quit:      Years since quittin.8   • Smokeless tobacco: Former User     Quit date:    Substance and Sexual Activity   • Alcohol use: No   • Drug use: No       Family History   Problem Relation Age of Onset   • Hyperlipidemia Mother    • Pancreatitis Father    • Diabetes Father    • Heart disease Sister    • Heart disease Brother    • Hypertension Daughter    • Hypertension Grandchild        Review of Systems   Constitution: Positive for malaise/fatigue. Negative for decreased appetite.   HENT: Negative for congestion and sore throat.    Eyes: Negative for blurred vision.   Cardiovascular: Positive for dyspnea on exertion. Negative for chest pain.   Respiratory: Positive for shortness of breath. Negative  "for snoring.    Endocrine: Negative for cold intolerance and heat intolerance.   Hematologic/Lymphatic: Negative for adenopathy. Does not bruise/bleed easily.   Skin: Negative for itching, nail changes and skin cancer.   Musculoskeletal: Negative for arthritis and myalgias.   Gastrointestinal: Negative for abdominal pain, dysphagia and heartburn.   Genitourinary: Negative for bladder incontinence and frequency.   Neurological: Negative for dizziness, light-headedness, seizures and vertigo.   Psychiatric/Behavioral: Negative for altered mental status.   Allergic/Immunologic: Negative for environmental allergies and hives.       Diabetes- Yes  Thyroid- normal    Objective     /62   Pulse 68   Ht 175.3 cm (69\")   Wt 98.4 kg (217 lb)   BMI 32.05 kg/m²     Physical Exam   Constitutional: He is oriented to person, place, and time. He appears well-developed and well-nourished.   HENT:   Head: Normocephalic.   Nose: Nose normal.   Eyes: EOM are normal. Pupils are equal, round, and reactive to light.   Neck: Normal range of motion. Neck supple.   Cardiovascular: Normal rate, regular rhythm, S1 normal and S2 normal.   Murmur heard.  Pulmonary/Chest: Effort normal and breath sounds normal.   Abdominal: Soft. Bowel sounds are normal.   Musculoskeletal: Normal range of motion. He exhibits no edema.   Neurological: He is alert and oriented to person, place, and time.   Skin: Skin is warm and dry.   Psychiatric: He has a normal mood and affect.     Procedures            Assessment/Plan   Patient's Body mass index is 32.05 kg/m². BMI is above normal parameters. Recommendations include: nutrition counseling.     Garry was seen today for hospital follow up, med refill, labs, medication problem and med dose change.    Diagnoses and all orders for this visit:    IHD (ischemic heart disease)  -     clopidogrel (PLAVIX) 75 MG tablet; Take 1 tablet by mouth Daily.  -     ranolazine (RANEXA) 1000 MG 12 hr tablet; Take 1 tablet " by mouth Every 12 (Twelve) Hours.    Essential hypertension  -     sacubitril-valsartan (ENTRESTO)  MG tablet; Take 1 tablet by mouth 2 (Two) Times a Day.  -     carvedilol (COREG) 6.25 MG tablet; Take 1 tablet by mouth 2 (Two) Times a Day.  -     amLODIPine (NORVASC) 5 MG tablet; Take 1 tablet by mouth Daily.    Hypercholesteremia  -     icosapent ethyl (VASCEPA) 1 g capsule capsule; Take 2 g by mouth 2 (Two) Times a Day With Meals.    Elevated triglycerides with high cholesterol  -     icosapent ethyl (VASCEPA) 1 g capsule capsule; Take 2 g by mouth 2 (Two) Times a Day With Meals.    Congestive heart failure with LV diastolic dysfunction, NYHA class 2 (CMS/[facility])  -     sacubitril-valsartan (ENTRESTO)  MG tablet; Take 1 tablet by mouth 2 (Two) Times a Day.  -     carvedilol (COREG) 6.25 MG tablet; Take 1 tablet by mouth 2 (Two) Times a Day.    Type 2 diabetes mellitus without complication, with long-term current use of insulin (CMS/[facility])    Stable angina pectoris (CMS/[facility])  -     isosorbide mononitrate (IMDUR) 60 MG 24 hr tablet; Take 1 tablet by mouth Every Morning.    At the baseline his heart rate is stable but the blood pressure is still slightly elevated.  He was on higher dose of Entresto and for some reason it was reduced.  I advised him to go back on the higher dose of Entresto.  I also explained to him that the reason why we started him on Vascepa.  I talked to him and his wife about the studies which has shown to reduce cardiovascular events with this medication.  He is willing to take it and prescription was given.  Meanwhile continue other medication including Coreg and Norvasc for his hypertension.  I also continued his Plavix and Ranexa for his ischemic heart disease.  I advised him to talk to you regarding his diabetes.  His BMI is still slightly elevated.  I did advise him to cut down on his carbohydrate intake.  Reassurance about his cardiac status was given.  I will see him back in 6  months or sooner if needed.                    Electronically signed by Augie Kaplan MD November 11, 2019 2:46 PM

## 2020-03-11 ENCOUNTER — OFFICE VISIT (OUTPATIENT)
Dept: CARDIOLOGY | Facility: CLINIC | Age: 75
End: 2020-03-11

## 2020-03-11 VITALS
BODY MASS INDEX: 32.7 KG/M2 | SYSTOLIC BLOOD PRESSURE: 164 MMHG | WEIGHT: 220.8 LBS | DIASTOLIC BLOOD PRESSURE: 68 MMHG | HEIGHT: 69 IN | HEART RATE: 72 BPM

## 2020-03-11 DIAGNOSIS — R42 DIZZINESS: Primary | ICD-10-CM

## 2020-03-11 DIAGNOSIS — I10 ESSENTIAL HYPERTENSION: ICD-10-CM

## 2020-03-11 DIAGNOSIS — I25.118 CORONARY ARTERY DISEASE OF NATIVE ARTERY OF NATIVE HEART WITH STABLE ANGINA PECTORIS (HCC): ICD-10-CM

## 2020-03-11 DIAGNOSIS — E78.01 FAMILIAL HYPERCHOLESTEROLEMIA: ICD-10-CM

## 2020-03-11 DIAGNOSIS — I65.23 BILATERAL CAROTID ARTERY STENOSIS: ICD-10-CM

## 2020-03-11 PROCEDURE — 99214 OFFICE O/P EST MOD 30 MIN: CPT | Performed by: NURSE PRACTITIONER

## 2020-03-11 RX ORDER — OMEPRAZOLE 20 MG/1
20 CAPSULE, DELAYED RELEASE ORAL DAILY
COMMUNITY
End: 2020-03-13 | Stop reason: ALTCHOICE

## 2020-03-11 RX ORDER — AMLODIPINE BESYLATE 5 MG/1
5 TABLET ORAL DAILY
Qty: 90 TABLET | Refills: 3
Start: 2020-03-11

## 2020-03-11 RX ORDER — ISOSORBIDE MONONITRATE 30 MG/1
30 TABLET, EXTENDED RELEASE ORAL NIGHTLY
COMMUNITY
End: 2020-04-13

## 2020-03-11 NOTE — PROGRESS NOTES
"Chief Complaint   Patient presents with   • Follow-up     Cardiac management.   • Lab     Last labs a month ago per PCP.    • Shortness of Breath     Has only when he walking up hills. Wears oxygen at night. He states \"I hear a grinding sound up my left neck, when I put my oxygen on it stops\".   • Dizziness     He states \"if I get out rushing to do things, I get dizzy\".     Subjective       Garry Yen is a 74 y.o. male with IHD diagnosed in 2013 s/p stenting of the RCA after which he has undergone multiple stents to the LAD and RCA. He also developed NYHA class III HF managed with Entresto. He also has carotid stenosis s/p stenting. His cholesterol/triglycerides have been difficult to manage as he is statin intolerant and insurance has denied Praluent. He did take Repatha in the past but he claims his glucose increased with this. He was started on Vascepa. He was admitted to the hospital in September 2019 with more chest pain. Stress showed inferolateral infarct with tahir-infarct ischemia. EF 31%. He continued to have symptoms of chest pain, shortness of breath and near syncope and he was advised to undergo cardiac cath. Cardiac cath revealed RCA stenosis and had two more stents placed. He came today for follow up. His chest pain has significantly improved post stenting. He admits to continued shortness of breath especially when he walks up an incline. No orthopnea, no cough. He continues to wear O2 at night. He c/o intermittent dizziness worsened with exertion. Labs are followed with Dr. Arredondo. In November 2019 , Tri 3258, HDL 13.     HPI         Cardiac History:    Past Surgical History:   Procedure Laterality Date   • CARDIAC CATHETERIZATION  06/16/2016    FFR of RCA- 0.72- 3.0x22 & 3.0x12 Resolute. FFR of LAD- 0.74- 2.5x8 & 2.5x26 Resolute   • CARDIAC CATHETERIZATION  07/16/2013    Cath-() 70%LAD. 90%RCA-3.5 x 18 & 3.5 x 15 Resolute Stents   • CARDIAC CATHETERIZATION  02/15/2017    85% " RCA- 3.5x24 & 3.5x20 Promus. 50% (L) ICA.   • CARDIAC CATHETERIZATION  09/07/2018    Dr. Marcial- 3.5 x 12 Resolute stent RCA, LVEF 40-45%   • CARDIAC CATHETERIZATION  10/15/2019    85% RCA- 3.5x38 & 3.0x22 Resolute. EF 40-45%   • CARDIOVASCULAR STRESS TEST  09/08/2009    L.Myoview-() Normal   • CARDIOVASCULAR STRESS TEST  03/03/2011    L.Myoview-() Normal   • CARDIOVASCULAR STRESS TEST  08/12/2014    L.Myoview-inferior wall ischemia, Ranexa increased   • CARDIOVASCULAR STRESS TEST  03/28/2016    EF 38%, inferior infarct, tahir-infarct ischemia   • CARDIOVASCULAR STRESS TEST  07/06/2016    EF 33%, WMA inferior wall, fixed defect no ischemia   • CARDIOVASCULAR STRESS TEST  03/28/2016    L. Cardiolite @ [facility]- EF 38%. Inferior Infarct and Ischemia   • CARDIOVASCULAR STRESS TEST  02/17/2006    Cardiolite-()Normal   • CARDIOVASCULAR STRESS TEST  07/06/2016    @[facility]. L. Myoview- EF 33%. Inferior and Small Anterior Infarct.   • CARDIOVASCULAR STRESS TEST  02/08/2017    L. Myoview- EF 30%. Inferior Ischemia.   • CARDIOVASCULAR STRESS TEST  08/17/2017    Lexiscan- no definite ischemia   • CARDIOVASCULAR STRESS TEST  08/14/2018    L. Cardiolite- EF 31 %. Inferior Infarct.   • CARDIOVASCULAR STRESS TEST  09/18/2019    @[facility]. L. Cardiolite- EF 41%. Inferolateral Infarct with mild Periinfarct Ischemia.   • CARDIOVASCULAR STRESS TEST  09/20/2007    A.Cardiodalton () Normal   • CAROTID STENT Left 11/05/2014    - 10x42 Wall Stent   • ECHO - CONVERTED  09/24/2007    Echo-([facility].) EF 60%   • ECHO - CONVERTED  03/03/2011    Echo-() EF65%   • ECHO - CONVERTED  08/12/2014    Echo-EF 40-45%, RVSP 16mmHg.   • ECHO - CONVERTED  08/11/2016    EF 40%. Inferior WMA.   • ECHO - CONVERTED  02/08/2017    EF 30%. Inferior WMA.   • ECHO - CONVERTED  08/17/2017    EF 40-45%, mild MR   • ECHO - CONVERTED  08/14/2018    TLS. EF 30-35%. Inferior WMA.   • ECHO - CONVERTED  09/18/2019    @  [facility]. TLS. EF 45%. Inferior WMA   • US CAROTID UNILATERAL  03/21/2011    Normnal   • US CAROTID UNILATERAL  10/13/2014    70% (L) ICA.100% (R) ICA   • US CAROTID UNILATERAL  11/24/2015    50% (L) ICA. 100% (R) ICA   • US CAROTID UNILATERAL  02/08/2017    100% (R) ICA. < 69% (L) ICA   • US CAROTID UNILATERAL  02/13/2018    occlusion of RICKEY, LICA 50-69%, common carotid 50-69%   • US CAROTID UNILATERAL  03/12/2019    DUSTY:  RICKEY 100% occlusion, LICA stent patent     Current Outpatient Medications   Medication Sig Dispense Refill   • amLODIPine (NORVASC) 5 MG tablet Take 1 tablet by mouth Daily. Can take extra 5 mg as needed for bp >150/90 90 tablet 3   • aspirin 81 MG EC tablet Take 81 mg by mouth Daily. Increase to twice a day     • carvedilol (COREG) 6.25 MG tablet Take 1 tablet by mouth 2 (Two) Times a Day. 180 tablet 3   • clopidogrel (PLAVIX) 75 MG tablet Take 1 tablet by mouth Daily. 90 tablet 3   • gabapentin (NEURONTIN) 600 MG tablet Take 600 mg by mouth 4 (Four) Times a Day.     • icosapent ethyl (VASCEPA) 1 g capsule capsule Take 2 g by mouth 2 (Two) Times a Day With Meals. 360 capsule 3   • insulin glargine (LANTUS) 100 UNIT/ML injection Inject  under the skin daily.     • isosorbide mononitrate (IMDUR) 30 MG 24 hr tablet Take 30 mg by mouth Every Night.     • isosorbide mononitrate (IMDUR) 60 MG 24 hr tablet Take 1 tablet by mouth Every Morning. 30 tablet 5   • metFORMIN (GLUCOPHAGE) 1000 MG tablet Take 1,000 mg by mouth 2 (two) times a day with meals.     • nitroglycerin (NITROSTAT) 0.4 MG SL tablet Place 0.4 mg under the tongue Every 5 (Five) Minutes As Needed for Chest Pain. Take no more than 3 doses in 15 minutes.     • omeprazole (priLOSEC) 20 MG capsule Take 20 mg by mouth Daily.     • ranolazine (RANEXA) 1000 MG 12 hr tablet Take 1 tablet by mouth Every 12 (Twelve) Hours. 180 tablet 3   • sacubitril-valsartan (ENTRESTO)  MG tablet Take 1 tablet by mouth 2 (Two) Times a Day. 180 tablet 3   •  Semaglutide (OZEMPIC, 0.25 OR 0.5 MG/DOSE, SC) Inject  under the skin into the appropriate area as directed.     • Evolocumab with Infusor (REPATHA PUSHTRONEX SYSTEM) 420 MG/3.5ML solution cartridge Inject 1 each under the skin into the appropriate area as directed Every 30 (Thirty) Days. 12 cartridge. 1     No current facility-administered medications for this visit.        Morphine and related and Niacin and related    Past Medical History:   Diagnosis Date   • Bilat. inguinal hernia repair    • CAD (coronary artery disease)    • Cancer (CMS/HCC)     skin cancer removed faced   • Carotid stenosis    • Diabetes mellitus (CMS/HCC)    • Diverticulitis    • GI bleed    • History of knee replacement     Bilat.   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    • Low sodium levels        Social History     Socioeconomic History   • Marital status:      Spouse name: Not on file   • Number of children: Not on file   • Years of education: Not on file   • Highest education level: Not on file   Tobacco Use   • Smoking status: Former Smoker     Last attempt to quit:      Years since quittin.2   • Smokeless tobacco: Former User     Quit date:    Substance and Sexual Activity   • Alcohol use: No   • Drug use: No       Family History   Problem Relation Age of Onset   • Hyperlipidemia Mother    • Pancreatitis Father    • Diabetes Father    • Heart disease Sister    • Heart disease Brother    • Hypertension Daughter    • Hypertension Grandchild      Review of Systems   Constitution: Positive for weight gain (up 3 lb). Negative for decreased appetite and malaise/fatigue.   HENT: Negative.    Eyes: Negative.    Cardiovascular: Positive for dyspnea on exertion. Negative for chest pain, leg swelling, palpitations and syncope.   Respiratory: Negative for cough and shortness of breath.    Endocrine: Negative.     "  Hematologic/Lymphatic: Negative.    Skin: Negative.    Musculoskeletal: Negative for myalgias.   Gastrointestinal: Negative for abdominal pain and melena.   Genitourinary: Negative for hematuria.   Neurological: Positive for dizziness.   Psychiatric/Behavioral: Negative for altered mental status and depression.   Allergic/Immunologic: Negative.       Diabetes- Yes  Thyroid-normal    Objective     /68 (BP Location: Right arm)   Pulse 72   Ht 175.3 cm (69.02\")   Wt 100 kg (220 lb 12.8 oz)   BMI 32.59 kg/m²     Physical Exam   Constitutional: He is oriented to person, place, and time. He appears well-developed and well-nourished. No distress.   HENT:   Head: Normocephalic.   Eyes: Pupils are equal, round, and reactive to light.   Neck: Normal range of motion.   Cardiovascular: Normal rate and regular rhythm.   Murmur heard.  Pulmonary/Chest: Effort normal and breath sounds normal.   Abdominal: Soft. Bowel sounds are normal.   Musculoskeletal: Normal range of motion. He exhibits no edema.   Neurological: He is alert and oriented to person, place, and time.   Skin: Skin is warm and dry. He is not diaphoretic.   Psychiatric: He has a normal mood and affect.   Nursing note and vitals reviewed.     Procedures          Assessment/Plan      Garry was seen today for follow-up, lab, shortness of breath and dizziness.    Diagnoses and all orders for this visit:    Dizziness  -     US Carotid Bilateral; Future    Essential hypertension  -     amLODIPine (NORVASC) 5 MG tablet; Take 1 tablet by mouth Daily. Can take extra 5 mg as needed for bp >150/90    Coronary artery disease of native artery of native heart with stable angina pectoris (CMS/HCC)    Bilateral carotid artery stenosis  -     US Carotid Bilateral; Future    Familial hypercholesterolemia    Other orders  -     Evolocumab with Infusor (REPATHA PUSHTRONEX SYSTEM) 420 MG/3.5ML solution cartridge; Inject 1 each under the skin into the appropriate area as " directed Every 30 (Thirty) Days.    1. CAD- stable without chest pain; s/p multiple stents to RCA and LAD most recent with 2 stents placed to RCA, EF 40-45%. Continue Plavix and aspirin. He remains on high dose Imdur and Ranexa. He is advised to avoid omeprazole with Plavix use. He admits to picking this up OTC. Can use Zantac.     2. HTN- elevated today. Patient claims he has taken his medication as prescribed and blood pressure is mostly well controlled at home. He is advised to use extra dose amlodipine 5 mg as needed for blood pressure >150/90. Continue carvedilol, Entresto.     3. Carotid stenosis/dizziness- he is s/p LICA stenting, 100% RICKEY stenosed. Will reassess carotid flow with US. Continue Plavix, asp. We need to get better control of lipids. Will send Repatha and try again to get approved.     4. Hypercholesterolemia- he has significant dyslipidemia and has tried every statin, Questran, PCSK9 apparently has been tried in the past but unclear about why this was stopped. Continue Vascepa for hypertriglyceridemia. I would like to try Repatha again and treat the diabetes more aggressively. If the triglycerides do not improve, rechallenge fenofibrate.     Could we get a copy of his most recent lipid profile? He appears stable at this time, anginal symptoms well controlled.     Patient's Body mass index is 32.59 kg/m². BMI is above normal parameters. Recommendations include: nutrition counseling.               Electronically signed by JULIO CÉSAR Hung,  March 13, 2020 08:46

## 2020-03-16 ENCOUNTER — PRIOR AUTHORIZATION (OUTPATIENT)
Dept: CARDIOLOGY | Facility: CLINIC | Age: 75
End: 2020-03-16

## 2020-03-17 ENCOUNTER — TELEPHONE (OUTPATIENT)
Dept: CARDIOLOGY | Facility: CLINIC | Age: 75
End: 2020-03-17

## 2020-03-17 DIAGNOSIS — Z51.81 MEDICATION MONITORING ENCOUNTER: ICD-10-CM

## 2020-03-17 DIAGNOSIS — I65.23 BILATERAL CAROTID ARTERY STENOSIS: Primary | ICD-10-CM

## 2020-03-17 NOTE — TELEPHONE ENCOUNTER
Order placed for patient to have CTA neck and BMP prior.     Can schedule at [facility] or [facility].     US showed 100% RICKEY, >70 LICA. He is s/p stenting of LICA in 2014.

## 2020-03-18 NOTE — TELEPHONE ENCOUNTER
Wife Katerina made aware carotid CTA scheduled for 3/26/2020 at 1:30pm, need to be at [facility] at 1pm for labs, Katerina verbalized understanding and to inform patient.

## 2020-04-13 DIAGNOSIS — I25.118 CORONARY ARTERY DISEASE OF NATIVE ARTERY OF NATIVE HEART WITH STABLE ANGINA PECTORIS (HCC): ICD-10-CM

## 2020-04-13 RX ORDER — ISOSORBIDE MONONITRATE 30 MG/1
TABLET, EXTENDED RELEASE ORAL
Qty: 30 TABLET | Refills: 11 | Status: SHIPPED | OUTPATIENT
Start: 2020-04-13

## 2020-09-08 ENCOUNTER — OUTSIDE FACILITY SERVICE (OUTPATIENT)
Dept: CARDIOLOGY | Facility: CLINIC | Age: 75
End: 2020-09-08

## 2020-09-08 ENCOUNTER — TELEPHONE (OUTPATIENT)
Dept: CARDIOLOGY | Facility: CLINIC | Age: 75
End: 2020-09-08

## 2020-09-08 PROCEDURE — 93321 DOPPLER ECHO F-UP/LMTD STD: CPT | Performed by: INTERNAL MEDICINE

## 2020-09-08 PROCEDURE — 99223 1ST HOSP IP/OBS HIGH 75: CPT | Performed by: INTERNAL MEDICINE

## 2020-09-08 PROCEDURE — 93325 DOPPLER ECHO COLOR FLOW MAPG: CPT | Performed by: INTERNAL MEDICINE

## 2020-09-08 PROCEDURE — 93308 TTE F-UP OR LMTD: CPT | Performed by: INTERNAL MEDICINE

## 2020-09-08 NOTE — TELEPHONE ENCOUNTER
"Wife called reporting that patient has been having chest pain that worsens with minimal exertion, pain has been radiating to left arm, has took 4 nitro sl tablets in the last 24 hours and having increased shortness of breath. This has started after being in [facility] for Diverticulitis a week ago with blood loss.     Advised patient to go to ER. Wife states \"he has went to [facility] and all they said was he had no heart attack at that time\", wife states \"he attacks just like he did when his stent collapsed. Advised patient to go to [facility] ER, wife states \"that is where I wanted him to go before\".   "

## 2020-09-09 ENCOUNTER — OUTSIDE FACILITY SERVICE (OUTPATIENT)
Dept: CARDIOLOGY | Facility: CLINIC | Age: 75
End: 2020-09-09

## 2020-09-09 PROCEDURE — 93458 L HRT ARTERY/VENTRICLE ANGIO: CPT | Performed by: INTERNAL MEDICINE

## 2020-09-09 PROCEDURE — 99232 SBSQ HOSP IP/OBS MODERATE 35: CPT | Performed by: INTERNAL MEDICINE

## 2020-09-10 ENCOUNTER — OUTSIDE FACILITY SERVICE (OUTPATIENT)
Dept: CARDIOLOGY | Facility: CLINIC | Age: 75
End: 2020-09-10

## 2020-09-10 PROCEDURE — 99232 SBSQ HOSP IP/OBS MODERATE 35: CPT | Performed by: INTERNAL MEDICINE

## 2020-09-11 PROCEDURE — 93308 TTE F-UP OR LMTD: CPT | Performed by: INTERNAL MEDICINE

## 2020-09-12 ENCOUNTER — OUTSIDE FACILITY SERVICE (OUTPATIENT)
Dept: CARDIOLOGY | Facility: CLINIC | Age: 75
End: 2020-09-12

## 2023-05-26 NOTE — TELEPHONE ENCOUNTER
OK   Pt arrives to the ED via EMS from home.  Per EMS the pt began to feel dizzy approximately 4 hours prior to arrival and attempted to go to sleep.  Per the pt once he laid down and closed his eyes to sleep he began to vomit \"violently\".  Pt reports no pain and is A/Ox4.